# Patient Record
Sex: FEMALE | Race: WHITE | NOT HISPANIC OR LATINO | Employment: FULL TIME | URBAN - METROPOLITAN AREA
[De-identification: names, ages, dates, MRNs, and addresses within clinical notes are randomized per-mention and may not be internally consistent; named-entity substitution may affect disease eponyms.]

---

## 2023-05-02 ENCOUNTER — OFFICE VISIT (OUTPATIENT)
Dept: PRIMARY CARE | Facility: CLINIC | Age: 33
End: 2023-05-02
Payer: COMMERCIAL

## 2023-05-02 VITALS
HEIGHT: 65 IN | HEART RATE: 76 BPM | OXYGEN SATURATION: 97 % | WEIGHT: 207 LBS | DIASTOLIC BLOOD PRESSURE: 83 MMHG | BODY MASS INDEX: 34.49 KG/M2 | SYSTOLIC BLOOD PRESSURE: 137 MMHG

## 2023-05-02 DIAGNOSIS — G43.109 MIGRAINE WITH AURA AND WITHOUT STATUS MIGRAINOSUS, NOT INTRACTABLE: ICD-10-CM

## 2023-05-02 DIAGNOSIS — F41.9 ANXIETY: ICD-10-CM

## 2023-05-02 DIAGNOSIS — F90.2 ATTENTION DEFICIT HYPERACTIVITY DISORDER (ADHD), COMBINED TYPE: Primary | ICD-10-CM

## 2023-05-02 PROBLEM — E55.9 VITAMIN D DEFICIENCY: Status: ACTIVE | Noted: 2023-05-02

## 2023-05-02 PROBLEM — E53.8 VITAMIN B12 DEFICIENCY: Status: ACTIVE | Noted: 2023-05-02

## 2023-05-02 PROBLEM — R63.5 WEIGHT GAIN: Status: RESOLVED | Noted: 2023-05-02 | Resolved: 2023-05-02

## 2023-05-02 LAB
AMPHETAMINE (PRESENCE) IN URINE BY SCREEN METHOD: NORMAL
BARBITURATES PRESENCE IN URINE BY SCREEN METHOD: NORMAL
BENZODIAZEPINE (PRESENCE) IN URINE BY SCREEN METHOD: NORMAL
CANNABINOIDS IN URINE BY SCREEN METHOD: NORMAL
COCAINE (PRESENCE) IN URINE BY SCREEN METHOD: NORMAL
DRUG SCREEN COMMENT URINE: NORMAL
FENTANYL URINE: NORMAL
METHADONE (PRESENCE) IN URINE BY SCREEN METHOD: NORMAL
OPIATES (PRESENCE) IN URINE BY SCREEN METHOD: NORMAL
OXYCODONE (PRESENCE) IN URINE BY SCREEN METHOD: NORMAL
PHENCYCLIDINE (PRESENCE) IN URINE BY SCREEN METHOD: NORMAL

## 2023-05-02 PROCEDURE — 80307 DRUG TEST PRSMV CHEM ANLYZR: CPT

## 2023-05-02 PROCEDURE — 1036F TOBACCO NON-USER: CPT | Performed by: FAMILY MEDICINE

## 2023-05-02 PROCEDURE — 99213 OFFICE O/P EST LOW 20 MIN: CPT | Performed by: FAMILY MEDICINE

## 2023-05-02 RX ORDER — NORETHINDRONE ACETATE AND ETHINYL ESTRADIOL 1MG-20(21)
KIT ORAL
COMMUNITY
Start: 2022-12-29 | End: 2023-11-03 | Stop reason: SDUPTHER

## 2023-05-02 RX ORDER — SUMATRIPTAN SUCCINATE 100 MG/1
TABLET ORAL
COMMUNITY
End: 2024-02-06 | Stop reason: ALTCHOICE

## 2023-05-02 RX ORDER — BUPROPION HYDROCHLORIDE 150 MG/1
1 TABLET ORAL DAILY
COMMUNITY
Start: 2018-10-23 | End: 2023-05-02 | Stop reason: SDUPTHER

## 2023-05-02 RX ORDER — ATENOLOL 25 MG/1
75 TABLET ORAL NIGHTLY
Qty: 270 TABLET | Refills: 3 | Status: SHIPPED | OUTPATIENT
Start: 2023-05-02 | End: 2023-11-03 | Stop reason: SDUPTHER

## 2023-05-02 RX ORDER — METHYLPHENIDATE HYDROCHLORIDE 27 MG/1
1 TABLET ORAL DAILY
COMMUNITY
Start: 2022-07-13 | End: 2023-05-02 | Stop reason: SDUPTHER

## 2023-05-02 RX ORDER — METHYLPHENIDATE HYDROCHLORIDE 27 MG/1
27 TABLET ORAL DAILY
Qty: 90 TABLET | Refills: 0 | Status: SHIPPED | OUTPATIENT
Start: 2023-05-02 | End: 2023-08-01 | Stop reason: SDUPTHER

## 2023-05-02 RX ORDER — ATENOLOL 25 MG/1
TABLET ORAL
COMMUNITY
Start: 2019-01-24 | End: 2023-05-02 | Stop reason: SDUPTHER

## 2023-05-02 RX ORDER — BUPROPION HYDROCHLORIDE 150 MG/1
150 TABLET ORAL DAILY
Qty: 90 TABLET | Refills: 3 | Status: SHIPPED | OUTPATIENT
Start: 2023-05-02 | End: 2023-11-03 | Stop reason: SDUPTHER

## 2023-05-02 NOTE — PROGRESS NOTES
Subjective   Patient ID: Regine Palomino is a 32 y.o. female who presents for ADHD.  Doing well.  Acute complaints -not with her meds.      She has an occasional heart flutter at rest that happens several times a day, not painful.  It lasts just a few seconds.  This started about 2-3 months ago, no new stress, sleep is fine.      Last visit was 3-4 months ago    OARRS:  No data recorded  I have personally reviewed the OARRS report for Regine Palomino. I have considered the risks of abuse, dependence, addiction and diversion and I believe that it is clinically appropriate for Regine Palomino to be prescribed this medication    Is the patient prescribed a combination of a benzodiazepine and opioid?  No    Last Urine Drug Screen / ordered today: Yes  Recent Results (from the past 36878 hour(s))   Drug Screen, Urine With Reflex to Confirmation    Collection Time: 04/13/22  4:40 PM   Result Value Ref Range    DRUG SCREEN COMMENT URINE SEE BELOW     Amphetamine Screen, Urine PRESUMPTIVE NEGATIVE NEGATIVE    Barbiturate Screen, Urine PRESUMPTIVE NEGATIVE NEGATIVE    BENZODIAZEPINE (PRESENCE) IN URINE BY SCREEN METHOD PRESUMPTIVE NEGATIVE NEGATIVE    Cannabinoid Screen, Urine PRESUMPTIVE NEGATIVE NEGATIVE    Cocaine Screen, Urine PRESUMPTIVE NEGATIVE NEGATIVE    Fentanyl, Ur PRESUMPTIVE NEGATIVE NEGATIVE    Methadone Screen, Urine PRESUMPTIVE NEGATIVE NEGATIVE    Opiate Screen, Urine PRESUMPTIVE NEGATIVE NEGATIVE    Oxycodone Screen, Ur PRESUMPTIVE NEGATIVE NEGATIVE    PCP Screen, Urine PRESUMPTIVE NEGATIVE NEGATIVE     Results are as expected.     Controlled Substance Agreement:  Date of the Last Agreement: today  Reviewed Controlled Substance Agreement including but not limited to the benefits, risks, and alternatives to treatment with a Controlled Substance medication(s).    Stimulants:   What is the patient's goal of therapy? Better focus  Is this being achieved with current treatment? yes    Activities of  "Daily Living:   Is your overall impression that this patient is benefiting (symptom reduction outweighs side effects) from stimulant therapy? Yes     1. Physical Functioning: Better  2. Family Relationship: Same  3. Social Relationship: Same  4. Mood: Better  5. Sleep Patterns: Same  6. Overall Function: Better      PE:  /83 (BP Location: Right arm)   Pulse 76   Ht 1.651 m (5' 5\")   Wt 93.9 kg (207 lb)   SpO2 97%   BMI 34.45 kg/m²   Alert, NAD  Heart RRR no murmur  Lungs CTA bilat  Affect pleasant    Assessment/Plan   Problem List Items Addressed This Visit          Other    Anxiety    Relevant Medications    buPROPion XL (Wellbutrin XL) 150 mg 24 hr tablet    Other Relevant Orders    Drug Screen, Urine With Reflex to Confirmation (Completed)    Attention deficit hyperactivity disorder (ADHD), combined type - Primary    Relevant Medications    methylphenidate (Concerta) 27 mg daily tablet    Other Relevant Orders    Drug Screen, Urine With Reflex to Confirmation (Completed)    Migraine with aura and without status migrainosus, not intractable    Relevant Medications    atenolol (Tenormin) 25 mg tablet     Follow up in 90 days or prn       "

## 2023-08-01 ENCOUNTER — OFFICE VISIT (OUTPATIENT)
Dept: PRIMARY CARE | Facility: CLINIC | Age: 33
End: 2023-08-01
Payer: COMMERCIAL

## 2023-08-01 VITALS
SYSTOLIC BLOOD PRESSURE: 114 MMHG | HEART RATE: 77 BPM | WEIGHT: 201 LBS | DIASTOLIC BLOOD PRESSURE: 81 MMHG | BODY MASS INDEX: 33.45 KG/M2 | OXYGEN SATURATION: 97 %

## 2023-08-01 DIAGNOSIS — E16.2 HYPOGLYCEMIA: ICD-10-CM

## 2023-08-01 DIAGNOSIS — F41.9 ANXIETY: ICD-10-CM

## 2023-08-01 DIAGNOSIS — F90.2 ATTENTION DEFICIT HYPERACTIVITY DISORDER (ADHD), COMBINED TYPE: Primary | ICD-10-CM

## 2023-08-01 PROCEDURE — 1036F TOBACCO NON-USER: CPT | Performed by: FAMILY MEDICINE

## 2023-08-01 PROCEDURE — 99213 OFFICE O/P EST LOW 20 MIN: CPT | Performed by: FAMILY MEDICINE

## 2023-08-01 RX ORDER — METHYLPHENIDATE HYDROCHLORIDE 27 MG/1
27 TABLET ORAL DAILY
Qty: 90 TABLET | Refills: 0 | Status: SHIPPED | OUTPATIENT
Start: 2023-08-01 | End: 2023-11-03 | Stop reason: SDUPTHER

## 2023-08-01 RX ORDER — ZOLMITRIPTAN 2.5 MG/1
TABLET, ORALLY DISINTEGRATING ORAL
COMMUNITY
Start: 2023-01-03 | End: 2024-02-06 | Stop reason: SDUPTHER

## 2023-08-01 NOTE — PROGRESS NOTES
Subjective   Patient ID: Regine Palomino is a 32 y.o. female who presents for ADHD (3 month FU).  Doing well.  Acute complaints -none    Last visit was 3 months ago    OARRS:  No data recorded  I have personally reviewed the OARRS report for Regine Palomino. I have considered the risks of abuse, dependence, addiction and diversion and I believe that it is clinically appropriate for Regine Palomino to be prescribed this medication    Is the patient prescribed a combination of a benzodiazepine and opioid?  No    Last Urine Drug Screen / ordered today: No  Recent Results (from the past 75252 hour(s))   Drug Screen, Urine With Reflex to Confirmation    Collection Time: 05/02/23  4:49 PM   Result Value Ref Range    DRUG SCREEN COMMENT URINE SEE BELOW     Amphetamine Screen, Urine PRESUMPTIVE NEGATIVE NEGATIVE    Barbiturate Screen, Urine PRESUMPTIVE NEGATIVE NEGATIVE    BENZODIAZEPINE (PRESENCE) IN URINE BY SCREEN METHOD PRESUMPTIVE NEGATIVE NEGATIVE    Cannabinoid Screen, Urine PRESUMPTIVE NEGATIVE NEGATIVE    Cocaine Screen, Urine PRESUMPTIVE NEGATIVE NEGATIVE    Fentanyl, Ur PRESUMPTIVE NEGATIVE NEGATIVE    Methadone Screen, Urine PRESUMPTIVE NEGATIVE NEGATIVE    Opiate Screen, Urine PRESUMPTIVE NEGATIVE NEGATIVE    Oxycodone Screen, Ur PRESUMPTIVE NEGATIVE NEGATIVE    PCP Screen, Urine PRESUMPTIVE NEGATIVE NEGATIVE     Results are as expected.     Controlled Substance Agreement:  Date of the Last Agreement: May 2023  Reviewed Controlled Substance Agreement including but not limited to the benefits, risks, and alternatives to treatment with a Controlled Substance medication(s).    Stimulants:   What is the patient's goal of therapy? Better focus  Is this being achieved with current treatment? yes    Activities of Daily Living:   Is your overall impression that this patient is benefiting (symptom reduction outweighs side effects) from stimulant therapy? Yes     1. Physical Functioning: Better  2. Family  Relationship: Same  3. Social Relationship: Better  4. Mood: Better  5. Sleep Patterns: Same  6. Overall Function: Better      PE:  /81 (BP Location: Left arm)   Pulse 77   Wt 91.2 kg (201 lb)   SpO2 97%   BMI 33.45 kg/m²   Alert, NAD  Heart RRR no murmur  Lungs CTA bilat  Affect pleasant    Assessment/Plan   Problem List Items Addressed This Visit       Anxiety    Attention deficit hyperactivity disorder (ADHD), combined type - Primary    Relevant Medications    methylphenidate (Concerta) 27 mg daily tablet     Other Visit Diagnoses       Hypoglycemia            She had some questions about feeling lightheaded whenever she has not eaten or skipped a meal.  Reviewed etiology of this and how to prevent.      Follw up 3 months

## 2023-10-22 PROBLEM — K80.10 GALLBLADDER STONE WITH NONACUTE CHOLECYSTITIS: Status: ACTIVE | Noted: 2023-10-22

## 2023-10-22 PROBLEM — K82.8 GALLBLADDER SLUDGE: Status: ACTIVE | Noted: 2023-10-22

## 2023-10-22 PROBLEM — K82.9 GALLBLADDER DISORDER: Status: ACTIVE | Noted: 2023-10-22

## 2023-10-22 RX ORDER — OMEPRAZOLE 40 MG/1
40 CAPSULE, DELAYED RELEASE ORAL DAILY
COMMUNITY
Start: 2023-08-23

## 2023-10-24 ENCOUNTER — OFFICE VISIT (OUTPATIENT)
Dept: SURGERY | Facility: CLINIC | Age: 33
End: 2023-10-24
Payer: COMMERCIAL

## 2023-10-24 VITALS
RESPIRATION RATE: 17 BRPM | WEIGHT: 208.8 LBS | HEART RATE: 82 BPM | HEIGHT: 65 IN | SYSTOLIC BLOOD PRESSURE: 141 MMHG | TEMPERATURE: 98 F | DIASTOLIC BLOOD PRESSURE: 88 MMHG | OXYGEN SATURATION: 98 % | BODY MASS INDEX: 34.79 KG/M2

## 2023-10-24 DIAGNOSIS — K21.9 GERD WITHOUT ESOPHAGITIS: Primary | ICD-10-CM

## 2023-10-24 DIAGNOSIS — Z01.818 PRE-OP EXAM: ICD-10-CM

## 2023-10-24 DIAGNOSIS — K80.20 CALCULUS OF GALLBLADDER WITHOUT CHOLECYSTITIS WITHOUT OBSTRUCTION: ICD-10-CM

## 2023-10-24 PROCEDURE — 99213 OFFICE O/P EST LOW 20 MIN: CPT | Performed by: SURGERY

## 2023-10-24 PROCEDURE — 1036F TOBACCO NON-USER: CPT | Performed by: SURGERY

## 2023-10-24 RX ORDER — SODIUM CHLORIDE, SODIUM LACTATE, POTASSIUM CHLORIDE, CALCIUM CHLORIDE 600; 310; 30; 20 MG/100ML; MG/100ML; MG/100ML; MG/100ML
100 INJECTION, SOLUTION INTRAVENOUS CONTINUOUS
Status: CANCELLED | OUTPATIENT
Start: 2023-10-24

## 2023-10-24 RX ORDER — AMOXICILLIN 875 MG/1
TABLET, FILM COATED ORAL
Status: ON HOLD | COMMUNITY
Start: 2023-10-13 | End: 2023-10-30 | Stop reason: ALTCHOICE

## 2023-10-24 RX ORDER — CELECOXIB 50 MG/1
400 CAPSULE ORAL ONCE
Status: CANCELLED | OUTPATIENT
Start: 2023-10-24 | End: 2023-10-24

## 2023-10-24 RX ORDER — HEPARIN SODIUM 5000 [USP'U]/ML
5000 INJECTION, SOLUTION INTRAVENOUS; SUBCUTANEOUS ONCE
Status: CANCELLED | OUTPATIENT
Start: 2023-10-24 | End: 2023-10-24

## 2023-10-24 RX ORDER — GABAPENTIN 300 MG/1
300 CAPSULE ORAL ONCE
Status: CANCELLED | OUTPATIENT
Start: 2023-10-24 | End: 2023-10-24

## 2023-10-24 RX ORDER — SCOLOPAMINE TRANSDERMAL SYSTEM 1 MG/1
1 PATCH, EXTENDED RELEASE TRANSDERMAL
Status: CANCELLED | OUTPATIENT
Start: 2023-10-24 | End: 2023-10-27

## 2023-10-24 RX ORDER — ACETAMINOPHEN 325 MG/1
975 TABLET ORAL ONCE
Status: CANCELLED | OUTPATIENT
Start: 2023-10-24 | End: 2023-10-24

## 2023-10-24 ASSESSMENT — PAIN SCALES - GENERAL: PAINLEVEL: 0-NO PAIN

## 2023-10-24 NOTE — H&P (VIEW-ONLY)
Subjective   Patient ID: Regine Palomino is a 32 y.o. female who presents for No chief complaint on file..  HPI  32 year old female BMI 34 who has history of acute epigastric pain has been happening for a month now. She had 4 episodes of intense pain that woke her up from sleep where she has to get out of bed and walk around, that radiates to back. She has not tried any medication for this pain. Abdominal US 08/24/2023 shows a very large stone and sludge in a long narrow gallbladder without thickening. She is scheduled 09/12 for an EGD with Dr. Ortiz. She is on omeprazole without relief. Certain foods do not set it off. She has never gone to the ED for it. There is no biochemical data to support pancreatitis. She does not drink much, nonsmoker, no foreign travel. Atenolol for headaches.     Skipped EGD but still has reflux. Follows with Dr. Ortiz.    Returned to clinic 10/24/2023 for preop visit.    Review of Systems  Constitutional: no fever and no chills.   Gastrointestinal: abdominal pain, nausea, but no constipation, no heartburn, no vomiting, no blood in stools, bowel movement frequency normal, no diarrhea, no dysphagia.   Psychiatric: no anxiety.     Objective   Physical Exam  Constitutional - General appearance: In no acute distress, well appearing and well nourished.   Pulmonary - Respiratory effort: Normal respiration.   Cardiovascular - Auscultation of heart: Normal rate and rhythm, no murmurs.   Abdomen - Abdomen: Non-tender, no abdominal masses.   Assessment/Plan   Problem List Items Addressed This Visit             ICD-10-CM       Gastrointestinal and Abdominal    Calculus of gallbladder without cholecystitis without obstruction K80.20     Consented for lap yuval.         GERD without esophagitis - Primary K21.9     Intra-op EGD.          Other Visit Diagnoses         Codes    Pre-op exam     Z01.818

## 2023-10-26 ENCOUNTER — APPOINTMENT (OUTPATIENT)
Dept: SURGERY | Facility: CLINIC | Age: 33
End: 2023-10-26
Payer: COMMERCIAL

## 2023-10-30 ENCOUNTER — APPOINTMENT (OUTPATIENT)
Dept: GASTROENTEROLOGY | Facility: HOSPITAL | Age: 33
End: 2023-10-30
Payer: COMMERCIAL

## 2023-10-30 ENCOUNTER — ANESTHESIA EVENT (OUTPATIENT)
Dept: OPERATING ROOM | Facility: HOSPITAL | Age: 33
End: 2023-10-30
Payer: COMMERCIAL

## 2023-10-30 ENCOUNTER — ANESTHESIA (OUTPATIENT)
Dept: OPERATING ROOM | Facility: HOSPITAL | Age: 33
End: 2023-10-30
Payer: COMMERCIAL

## 2023-10-30 ENCOUNTER — APPOINTMENT (OUTPATIENT)
Dept: PRIMARY CARE | Facility: CLINIC | Age: 33
End: 2023-10-30
Payer: COMMERCIAL

## 2023-10-30 ENCOUNTER — HOSPITAL ENCOUNTER (OUTPATIENT)
Facility: HOSPITAL | Age: 33
Setting detail: OUTPATIENT SURGERY
Discharge: HOME | End: 2023-10-30
Attending: SURGERY | Admitting: SURGERY
Payer: COMMERCIAL

## 2023-10-30 VITALS
DIASTOLIC BLOOD PRESSURE: 69 MMHG | TEMPERATURE: 98.4 F | WEIGHT: 208.78 LBS | HEART RATE: 66 BPM | SYSTOLIC BLOOD PRESSURE: 116 MMHG | BODY MASS INDEX: 34.78 KG/M2 | OXYGEN SATURATION: 96 % | HEIGHT: 65 IN | RESPIRATION RATE: 18 BRPM

## 2023-10-30 DIAGNOSIS — K80.10 CALCULUS OF GALLBLADDER WITH CHRONIC CHOLECYSTITIS WITHOUT OBSTRUCTION: ICD-10-CM

## 2023-10-30 DIAGNOSIS — K21.9 GERD WITHOUT ESOPHAGITIS: ICD-10-CM

## 2023-10-30 DIAGNOSIS — K80.10 GALLBLADDER STONE WITH NONACUTE CHOLECYSTITIS: Primary | ICD-10-CM

## 2023-10-30 LAB
HCT VFR BLD AUTO: 40.1 % (ref 36–46)
HGB BLD-MCNC: 13.2 G/DL (ref 12–16)
PREGNANCY TEST URINE, POC: NEGATIVE

## 2023-10-30 PROCEDURE — 81025 URINE PREGNANCY TEST: CPT | Performed by: SURGERY

## 2023-10-30 PROCEDURE — 96372 THER/PROPH/DIAG INJ SC/IM: CPT | Performed by: SURGERY

## 2023-10-30 PROCEDURE — 88305 TISSUE EXAM BY PATHOLOGIST: CPT | Performed by: STUDENT IN AN ORGANIZED HEALTH CARE EDUCATION/TRAINING PROGRAM

## 2023-10-30 PROCEDURE — 47562 LAPAROSCOPIC CHOLECYSTECTOMY: CPT | Performed by: SURGERY

## 2023-10-30 PROCEDURE — 3700000002 HC GENERAL ANESTHESIA TIME - EACH INCREMENTAL 1 MINUTE: Performed by: SURGERY

## 2023-10-30 PROCEDURE — 2720000007 HC OR 272 NO HCPCS: Performed by: SURGERY

## 2023-10-30 PROCEDURE — A4217 STERILE WATER/SALINE, 500 ML: HCPCS | Performed by: SURGERY

## 2023-10-30 PROCEDURE — 88304 TISSUE EXAM BY PATHOLOGIST: CPT | Mod: TC,PARLAB | Performed by: SURGERY

## 2023-10-30 PROCEDURE — 2780000003 HC OR 278 NO HCPCS: Performed by: SURGERY

## 2023-10-30 PROCEDURE — 3600000004 HC OR TIME - INITIAL BASE CHARGE - PROCEDURE LEVEL FOUR: Performed by: SURGERY

## 2023-10-30 PROCEDURE — 88342 IMHCHEM/IMCYTCHM 1ST ANTB: CPT | Performed by: STUDENT IN AN ORGANIZED HEALTH CARE EDUCATION/TRAINING PROGRAM

## 2023-10-30 PROCEDURE — 2500000005 HC RX 250 GENERAL PHARMACY W/O HCPCS: Performed by: NURSE ANESTHETIST, CERTIFIED REGISTERED

## 2023-10-30 PROCEDURE — 7100000009 HC PHASE TWO TIME - INITIAL BASE CHARGE: Performed by: SURGERY

## 2023-10-30 PROCEDURE — 2500000004 HC RX 250 GENERAL PHARMACY W/ HCPCS (ALT 636 FOR OP/ED): Performed by: SURGERY

## 2023-10-30 PROCEDURE — 3600000009 HC OR TIME - EACH INCREMENTAL 1 MINUTE - PROCEDURE LEVEL FOUR: Performed by: SURGERY

## 2023-10-30 PROCEDURE — 85014 HEMATOCRIT: CPT | Performed by: SURGERY

## 2023-10-30 PROCEDURE — 7100000010 HC PHASE TWO TIME - EACH INCREMENTAL 1 MINUTE: Performed by: SURGERY

## 2023-10-30 PROCEDURE — 2500000001 HC RX 250 WO HCPCS SELF ADMINISTERED DRUGS (ALT 637 FOR MEDICARE OP): Performed by: SURGERY

## 2023-10-30 PROCEDURE — A47562 PR LAP,CHOLECYSTECTOMY: Performed by: NURSE ANESTHETIST, CERTIFIED REGISTERED

## 2023-10-30 PROCEDURE — 7100000001 HC RECOVERY ROOM TIME - INITIAL BASE CHARGE: Performed by: SURGERY

## 2023-10-30 PROCEDURE — 2500000004 HC RX 250 GENERAL PHARMACY W/ HCPCS (ALT 636 FOR OP/ED): Performed by: NURSE ANESTHETIST, CERTIFIED REGISTERED

## 2023-10-30 PROCEDURE — 88305 TISSUE EXAM BY PATHOLOGIST: CPT | Mod: TC,SUR,PARLAB | Performed by: SURGERY

## 2023-10-30 PROCEDURE — 3700000001 HC GENERAL ANESTHESIA TIME - INITIAL BASE CHARGE: Performed by: SURGERY

## 2023-10-30 PROCEDURE — 88304 TISSUE EXAM BY PATHOLOGIST: CPT | Performed by: STUDENT IN AN ORGANIZED HEALTH CARE EDUCATION/TRAINING PROGRAM

## 2023-10-30 PROCEDURE — 43239 EGD BIOPSY SINGLE/MULTIPLE: CPT

## 2023-10-30 PROCEDURE — 36415 COLL VENOUS BLD VENIPUNCTURE: CPT | Performed by: SURGERY

## 2023-10-30 PROCEDURE — 43239 EGD BIOPSY SINGLE/MULTIPLE: CPT | Performed by: SURGERY

## 2023-10-30 PROCEDURE — 2500000004 HC RX 250 GENERAL PHARMACY W/ HCPCS (ALT 636 FOR OP/ED): Performed by: ANESTHESIOLOGY

## 2023-10-30 PROCEDURE — A47562 PR LAP,CHOLECYSTECTOMY: Performed by: ANESTHESIOLOGY

## 2023-10-30 PROCEDURE — 7100000002 HC RECOVERY ROOM TIME - EACH INCREMENTAL 1 MINUTE: Performed by: SURGERY

## 2023-10-30 RX ORDER — ONDANSETRON HYDROCHLORIDE 2 MG/ML
INJECTION, SOLUTION INTRAVENOUS AS NEEDED
Status: DISCONTINUED | OUTPATIENT
Start: 2023-10-30 | End: 2023-10-30

## 2023-10-30 RX ORDER — HYDROMORPHONE HYDROCHLORIDE 1 MG/ML
1 INJECTION, SOLUTION INTRAMUSCULAR; INTRAVENOUS; SUBCUTANEOUS EVERY 5 MIN PRN
Status: DISCONTINUED | OUTPATIENT
Start: 2023-10-30 | End: 2023-10-30 | Stop reason: HOSPADM

## 2023-10-30 RX ORDER — ACETAMINOPHEN 325 MG/1
975 TABLET ORAL ONCE
Status: COMPLETED | OUTPATIENT
Start: 2023-10-30 | End: 2023-10-30

## 2023-10-30 RX ORDER — MIDAZOLAM HYDROCHLORIDE 1 MG/ML
1 INJECTION, SOLUTION INTRAMUSCULAR; INTRAVENOUS ONCE AS NEEDED
Status: DISCONTINUED | OUTPATIENT
Start: 2023-10-30 | End: 2023-10-30 | Stop reason: HOSPADM

## 2023-10-30 RX ORDER — SCOLOPAMINE TRANSDERMAL SYSTEM 1 MG/1
1 PATCH, EXTENDED RELEASE TRANSDERMAL ONCE
Status: DISCONTINUED | OUTPATIENT
Start: 2023-10-30 | End: 2023-10-30 | Stop reason: HOSPADM

## 2023-10-30 RX ORDER — MEPERIDINE HYDROCHLORIDE 25 MG/ML
12.5 INJECTION INTRAMUSCULAR; INTRAVENOUS; SUBCUTANEOUS EVERY 10 MIN PRN
Status: DISCONTINUED | OUTPATIENT
Start: 2023-10-30 | End: 2023-10-30 | Stop reason: HOSPADM

## 2023-10-30 RX ORDER — FENTANYL CITRATE 50 UG/ML
INJECTION, SOLUTION INTRAMUSCULAR; INTRAVENOUS AS NEEDED
Status: DISCONTINUED | OUTPATIENT
Start: 2023-10-30 | End: 2023-10-30

## 2023-10-30 RX ORDER — ROCURONIUM BROMIDE 10 MG/ML
INJECTION, SOLUTION INTRAVENOUS AS NEEDED
Status: DISCONTINUED | OUTPATIENT
Start: 2023-10-30 | End: 2023-10-30

## 2023-10-30 RX ORDER — IBUPROFEN 600 MG/1
600 TABLET ORAL EVERY 6 HOURS
Qty: 20 TABLET | Refills: 0 | Status: SHIPPED | OUTPATIENT
Start: 2023-10-30 | End: 2023-11-04

## 2023-10-30 RX ORDER — CELECOXIB 100 MG/1
400 CAPSULE ORAL ONCE
Status: COMPLETED | OUTPATIENT
Start: 2023-10-30 | End: 2023-10-30

## 2023-10-30 RX ORDER — FAMOTIDINE 10 MG/ML
20 INJECTION INTRAVENOUS ONCE
Status: DISCONTINUED | OUTPATIENT
Start: 2023-10-30 | End: 2023-10-30 | Stop reason: HOSPADM

## 2023-10-30 RX ORDER — HEPARIN SODIUM 5000 [USP'U]/ML
5000 INJECTION, SOLUTION INTRAVENOUS; SUBCUTANEOUS ONCE
Status: COMPLETED | OUTPATIENT
Start: 2023-10-30 | End: 2023-10-30

## 2023-10-30 RX ORDER — BUPIVACAINE HYDROCHLORIDE 5 MG/ML
INJECTION, SOLUTION EPIDURAL; INTRACAUDAL AS NEEDED
Status: DISCONTINUED | OUTPATIENT
Start: 2023-10-30 | End: 2023-10-30 | Stop reason: HOSPADM

## 2023-10-30 RX ORDER — CEFAZOLIN SODIUM 2 G/100ML
2 INJECTION, SOLUTION INTRAVENOUS EVERY 8 HOURS
Status: DISCONTINUED | OUTPATIENT
Start: 2023-10-30 | End: 2023-10-30 | Stop reason: HOSPADM

## 2023-10-30 RX ORDER — ACETAMINOPHEN 325 MG/1
975 TABLET ORAL ONCE
Status: DISCONTINUED | OUTPATIENT
Start: 2023-10-30 | End: 2023-10-30 | Stop reason: HOSPADM

## 2023-10-30 RX ORDER — PROMETHAZINE HYDROCHLORIDE 25 MG/ML
12.5 INJECTION, SOLUTION INTRAMUSCULAR; INTRAVENOUS ONCE AS NEEDED
Status: DISCONTINUED | OUTPATIENT
Start: 2023-10-30 | End: 2023-10-30 | Stop reason: HOSPADM

## 2023-10-30 RX ORDER — GABAPENTIN 300 MG/1
300 CAPSULE ORAL ONCE
Status: COMPLETED | OUTPATIENT
Start: 2023-10-30 | End: 2023-10-30

## 2023-10-30 RX ORDER — SODIUM CHLORIDE 0.9 G/100ML
IRRIGANT IRRIGATION AS NEEDED
Status: DISCONTINUED | OUTPATIENT
Start: 2023-10-30 | End: 2023-10-30 | Stop reason: HOSPADM

## 2023-10-30 RX ORDER — SODIUM CHLORIDE, SODIUM LACTATE, POTASSIUM CHLORIDE, CALCIUM CHLORIDE 600; 310; 30; 20 MG/100ML; MG/100ML; MG/100ML; MG/100ML
100 INJECTION, SOLUTION INTRAVENOUS CONTINUOUS
Status: DISCONTINUED | OUTPATIENT
Start: 2023-10-30 | End: 2023-10-30 | Stop reason: HOSPADM

## 2023-10-30 RX ORDER — ACETAMINOPHEN 325 MG/1
650 TABLET ORAL EVERY 6 HOURS
Qty: 30 TABLET | Refills: 0 | Status: SHIPPED | OUTPATIENT
Start: 2023-10-30 | End: 2024-02-06 | Stop reason: ALTCHOICE

## 2023-10-30 RX ORDER — SCOLOPAMINE TRANSDERMAL SYSTEM 1 MG/1
1 PATCH, EXTENDED RELEASE TRANSDERMAL
Status: DISCONTINUED | OUTPATIENT
Start: 2023-10-30 | End: 2023-10-30 | Stop reason: HOSPADM

## 2023-10-30 RX ORDER — LIDOCAINE HCL/PF 100 MG/5ML
SYRINGE (ML) INTRAVENOUS AS NEEDED
Status: DISCONTINUED | OUTPATIENT
Start: 2023-10-30 | End: 2023-10-30

## 2023-10-30 RX ORDER — DIPHENHYDRAMINE HYDROCHLORIDE 50 MG/ML
25 INJECTION INTRAMUSCULAR; INTRAVENOUS ONCE AS NEEDED
Status: DISCONTINUED | OUTPATIENT
Start: 2023-10-30 | End: 2023-10-30 | Stop reason: HOSPADM

## 2023-10-30 RX ORDER — MIDAZOLAM HYDROCHLORIDE 1 MG/ML
INJECTION, SOLUTION INTRAMUSCULAR; INTRAVENOUS AS NEEDED
Status: DISCONTINUED | OUTPATIENT
Start: 2023-10-30 | End: 2023-10-30

## 2023-10-30 RX ORDER — PROPOFOL 10 MG/ML
INJECTION, EMULSION INTRAVENOUS AS NEEDED
Status: DISCONTINUED | OUTPATIENT
Start: 2023-10-30 | End: 2023-10-30

## 2023-10-30 RX ORDER — OXYCODONE HYDROCHLORIDE 5 MG/1
5 TABLET ORAL EVERY 6 HOURS PRN
Qty: 10 TABLET | Refills: 0 | Status: SHIPPED | OUTPATIENT
Start: 2023-10-30 | End: 2024-02-06 | Stop reason: ALTCHOICE

## 2023-10-30 RX ORDER — METOCLOPRAMIDE HYDROCHLORIDE 5 MG/ML
INJECTION INTRAMUSCULAR; INTRAVENOUS AS NEEDED
Status: DISCONTINUED | OUTPATIENT
Start: 2023-10-30 | End: 2023-10-30

## 2023-10-30 RX ORDER — ONDANSETRON HYDROCHLORIDE 2 MG/ML
4 INJECTION, SOLUTION INTRAVENOUS ONCE AS NEEDED
Status: DISCONTINUED | OUTPATIENT
Start: 2023-10-30 | End: 2023-10-30 | Stop reason: HOSPADM

## 2023-10-30 RX ORDER — ALBUTEROL SULFATE 0.83 MG/ML
2.5 SOLUTION RESPIRATORY (INHALATION) ONCE AS NEEDED
Status: DISCONTINUED | OUTPATIENT
Start: 2023-10-30 | End: 2023-10-30 | Stop reason: HOSPADM

## 2023-10-30 RX ORDER — KETOROLAC TROMETHAMINE 30 MG/ML
INJECTION, SOLUTION INTRAMUSCULAR; INTRAVENOUS AS NEEDED
Status: DISCONTINUED | OUTPATIENT
Start: 2023-10-30 | End: 2023-10-30

## 2023-10-30 RX ORDER — METOPROLOL TARTRATE 1 MG/ML
5 INJECTION, SOLUTION INTRAVENOUS ONCE
Status: DISCONTINUED | OUTPATIENT
Start: 2023-10-30 | End: 2023-10-30 | Stop reason: HOSPADM

## 2023-10-30 RX ORDER — LABETALOL HYDROCHLORIDE 5 MG/ML
5 INJECTION, SOLUTION INTRAVENOUS ONCE AS NEEDED
Status: DISCONTINUED | OUTPATIENT
Start: 2023-10-30 | End: 2023-10-30 | Stop reason: HOSPADM

## 2023-10-30 RX ORDER — HYDRALAZINE HYDROCHLORIDE 20 MG/ML
5 INJECTION INTRAMUSCULAR; INTRAVENOUS EVERY 30 MIN PRN
Status: DISCONTINUED | OUTPATIENT
Start: 2023-10-30 | End: 2023-10-30 | Stop reason: HOSPADM

## 2023-10-30 RX ORDER — DEXAMETHASONE SODIUM PHOSPHATE 4 MG/ML
INJECTION, SOLUTION INTRA-ARTICULAR; INTRALESIONAL; INTRAMUSCULAR; INTRAVENOUS; SOFT TISSUE AS NEEDED
Status: DISCONTINUED | OUTPATIENT
Start: 2023-10-30 | End: 2023-10-30

## 2023-10-30 RX ORDER — MORPHINE SULFATE 2 MG/ML
2 INJECTION, SOLUTION INTRAMUSCULAR; INTRAVENOUS EVERY 5 MIN PRN
Status: DISCONTINUED | OUTPATIENT
Start: 2023-10-30 | End: 2023-10-30 | Stop reason: HOSPADM

## 2023-10-30 RX ADMIN — MIDAZOLAM 2 MG: 1 INJECTION INTRAMUSCULAR; INTRAVENOUS at 12:29

## 2023-10-30 RX ADMIN — FENTANYL CITRATE 100 MCG: 50 INJECTION, SOLUTION INTRAMUSCULAR; INTRAVENOUS at 12:44

## 2023-10-30 RX ADMIN — LIDOCAINE HYDROCHLORIDE 100 MG: 20 INJECTION INTRAVENOUS at 12:31

## 2023-10-30 RX ADMIN — CEFAZOLIN SODIUM 2 G: 2 INJECTION, SOLUTION INTRAVENOUS at 12:36

## 2023-10-30 RX ADMIN — SODIUM CHLORIDE, POTASSIUM CHLORIDE, SODIUM LACTATE AND CALCIUM CHLORIDE 100 ML/HR: 600; 310; 30; 20 INJECTION, SOLUTION INTRAVENOUS at 08:37

## 2023-10-30 RX ADMIN — ACETAMINOPHEN 975 MG: 325 TABLET ORAL at 10:17

## 2023-10-30 RX ADMIN — SCOPALAMINE 1 PATCH: 1 PATCH, EXTENDED RELEASE TRANSDERMAL at 10:19

## 2023-10-30 RX ADMIN — SUGAMMADEX 200 MG: 100 INJECTION, SOLUTION INTRAVENOUS at 13:20

## 2023-10-30 RX ADMIN — METOCLOPRAMIDE 10 MG: 5 INJECTION, SOLUTION INTRAMUSCULAR; INTRAVENOUS at 12:39

## 2023-10-30 RX ADMIN — HYDROMORPHONE HYDROCHLORIDE 1 MG: 1 INJECTION, SOLUTION INTRAMUSCULAR; INTRAVENOUS; SUBCUTANEOUS at 14:00

## 2023-10-30 RX ADMIN — FENTANYL CITRATE 50 MCG: 50 INJECTION, SOLUTION INTRAMUSCULAR; INTRAVENOUS at 13:22

## 2023-10-30 RX ADMIN — ROCURONIUM BROMIDE 50 MG: 10 INJECTION, SOLUTION INTRAVENOUS at 12:31

## 2023-10-30 RX ADMIN — KETOROLAC TROMETHAMINE 30 MG: 30 INJECTION, SOLUTION INTRAMUSCULAR at 13:14

## 2023-10-30 RX ADMIN — PROPOFOL 200 MG: 10 INJECTION, EMULSION INTRAVENOUS at 12:31

## 2023-10-30 RX ADMIN — DEXAMETHASONE SODIUM PHOSPHATE 4 MG: 4 INJECTION, SOLUTION INTRAMUSCULAR; INTRAVENOUS at 12:39

## 2023-10-30 RX ADMIN — ONDANSETRON 4 MG: 2 INJECTION INTRAMUSCULAR; INTRAVENOUS at 13:14

## 2023-10-30 RX ADMIN — FENTANYL CITRATE 50 MCG: 50 INJECTION, SOLUTION INTRAMUSCULAR; INTRAVENOUS at 12:31

## 2023-10-30 RX ADMIN — FENTANYL CITRATE 50 MCG: 50 INJECTION, SOLUTION INTRAMUSCULAR; INTRAVENOUS at 13:18

## 2023-10-30 RX ADMIN — GABAPENTIN 300 MG: 300 CAPSULE ORAL at 10:20

## 2023-10-30 RX ADMIN — HEPARIN SODIUM 5000 UNITS: 5000 INJECTION INTRAVENOUS; SUBCUTANEOUS at 10:20

## 2023-10-30 RX ADMIN — CELECOXIB 400 MG: 100 CAPSULE ORAL at 10:18

## 2023-10-30 RX ADMIN — SODIUM CHLORIDE, POTASSIUM CHLORIDE, SODIUM LACTATE AND CALCIUM CHLORIDE: 600; 310; 30; 20 INJECTION, SOLUTION INTRAVENOUS at 12:26

## 2023-10-30 SDOH — HEALTH STABILITY: MENTAL HEALTH: CURRENT SMOKER: 0

## 2023-10-30 ASSESSMENT — PAIN SCALES - GENERAL
PAINLEVEL_OUTOF10: 0 - NO PAIN
PAINLEVEL_OUTOF10: 0 - NO PAIN
PAIN_LEVEL: 4
PAINLEVEL_OUTOF10: 7
PAINLEVEL_OUTOF10: 0 - NO PAIN

## 2023-10-30 ASSESSMENT — PAIN - FUNCTIONAL ASSESSMENT
PAIN_FUNCTIONAL_ASSESSMENT: 0-10
PAIN_FUNCTIONAL_ASSESSMENT: 0-10

## 2023-10-30 NOTE — DISCHARGE INSTRUCTIONS
Incision is closed with surgical glue which will fall off on its own.   Okay to shower on 11/1. No soaking or submerging for 2 weeks.   Take tylenol for pain control. May take oxycodone only as needed for severe pain.   May drive when pain is well controlled and no longer taking narcotics.   No heavy lifting for 4 weeks.  Please call the office if you experience fevers, chills, redness around your incision, drainage, or swelling.   Please call the office to schedule your postoperative follow up visit for 2 weeks from now.

## 2023-10-30 NOTE — ANESTHESIA PROCEDURE NOTES
Airway  Date/Time: 10/30/2023 12:34 PM  Urgency: elective    Airway not difficult    Staffing  Performed: CRNA   Authorized by: Maury Car MD    Performed by: AURA Duran  Patient location during procedure: OR    Indications and Patient Condition  Indications for airway management: anesthesia and airway protection  Spontaneous Ventilation: absent  Sedation level: deep  Preoxygenated: yes  Patient position: sniffing  MILS maintained throughout  Mask difficulty assessment: 1 - vent by mask  Planned trial extubation    Final Airway Details  Final airway type: endotracheal airway      Successful airway: ETT  Cuffed: yes   Successful intubation technique: direct laryngoscopy  Facilitating devices/methods: intubating stylet  Blade: Papito  Blade size: #4  ETT size (mm): 7.0  Cormack-Lehane Classification: grade IIa - partial view of glottis  Placement verified by: capnometry   Measured from: lips  ETT to lips (cm): 21  Number of attempts at approach: 1  Number of other approaches attempted: 0

## 2023-10-30 NOTE — ANESTHESIA POSTPROCEDURE EVALUATION
Patient: Regine Palomino    Procedure Summary       Date: 10/30/23 Room / Location: [facility] 03 / [facility]    Anesthesia Start: 1227 Anesthesia Stop:     Procedures:       LAPAROSCOPIC CHOLECYSTECTOMY      EGD Diagnosis:       Calculus of gallbladder with chronic cholecystitis without obstruction      (Calculus of gallbladder with chronic cholecystitis without obstruction [K80.10])    Surgeons: Jefe Grissom MD PhD Responsible Provider: Maury Car MD    Anesthesia Type: general ASA Status: 2            Anesthesia Type: general    Vitals Value Taken Time   /75 10/30/23 1331   Temp 36.9 10/30/23 1332   Pulse 91 10/30/23 1330   Resp 16 10/30/23 1332   SpO2 99 % 10/30/23 1330   Vitals shown include unvalidated device data.    Anesthesia Post Evaluation    Patient location during evaluation: PACU  Patient participation: complete - patient participated  Level of consciousness: sleepy but conscious and awake and alert  Pain score: 4  Pain management: adequate  Airway patency: patent  Cardiovascular status: acceptable  Respiratory status: acceptable and face mask  Hydration status: acceptable        No notable events documented.

## 2023-10-30 NOTE — OP NOTE
LAPAROSCOPIC CHOLECYSTECTOMY, EGD Operative Note     Date: 10/30/2023  OR Location: [facility]    Name: Regine Palomino, : 1990, Age: 32 y.o., MRN: [id number], Sex: female    Diagnosis  Pre-op Diagnosis     * Calculus of gallbladder with chronic cholecystitis without obstruction [K80.10] Post-op Diagnosis     * Calculus of gallbladder with chronic cholecystitis without obstruction [K80.10]     Procedures    * LAPAROSCOPIC CHOLECYSTECTOMY    * EGD    Surgeons      * Jefe Grissom - Primary    Resident/Fellow/Other Assistant:  Surgeon(s) and Role:    Procedure Summary  Anesthesia: General  ASA: II  Anesthesia Staff: Anesthesiologist: Maury Car MD  CRNA: HUY Duran-CRNA  Estimated Blood Loss: 10 mL  Intra-op Medications:   Medication Name Total Dose   ceFAZolin in dextrose (iso-os) (Ancef) IVPB 2 g 2 g              Anesthesia Record               Intraprocedure I/O Totals          Intake    ceFAZolin in dextrose (iso-os) (Ancef) IVPB 2 g 100.00 mL    Total Intake 100 mL          Specimen:   ID Type Source Tests Collected by Time   1 : GALLBLADDER Tissue GALLBLADDER CHOLECYSTECTOMY SURGICAL PATHOLOGY EXAM Jefe Grissom MD PhD 10/30/2023 1259   2 : COLD BIOPSIES Tissue STOMACH ANTRUM BIOPSY SURGICAL PATHOLOGY EXAM Jefe Grissom MD PhD 10/30/2023 1315   3 : IRREGULAR Z LINE Tissue ESOPHAGUS DISTAL BIOPSY SURGICAL PATHOLOGY EXAM Jefe Grissom MD PhD 10/30/2023 1316        Staff:   Circulator: Marcelino Donald RN  Relief Circulator: Laureen Licona RN  Relief Scrub: Ana Luisa Enrique  Scrub Person: Shanice Ling RN         Drains and/or Catheters: * None in log *    Tourniquet Times:         Implants:     Findings: Elongated gallbladder with medialized cystic duct and artery against liver edge. Numerous small branches of cystic artery to cystic duct, which was of sufficient length and entered the infundibulum.    Indications: Regine Palomino is an 32 y.o. female who is  having surgery for Calculus of gallbladder with chronic cholecystitis without obstruction [K80.10].     The patient was seen in the preoperative area. The risks, benefits, complications, treatment options, non-operative alternatives, expected recovery and outcomes were discussed with the patient. The possibilities of reaction to medication, pulmonary aspiration, injury to surrounding structures, bleeding, recurrent infection, the need for additional procedures, failure to diagnose a condition, and creating a complication requiring transfusion or operation were discussed with the patient. The patient concurred with the proposed plan, giving informed consent.  The site of surgery was properly noted/marked if necessary per policy. The patient has been actively warmed in preoperative area. Preoperative antibiotics have been ordered and given within 1 hours of incision. Venous thrombosis prophylaxis have been ordered including bilateral sequential compression devices and chemical prophylaxis    Procedure Details: During the preoperative huddle the patient's identifiers, consent, operation details, and equipment was verified. The patient was taken to the operating room and then placed in the supine position with both arms abducted and pressure points were padded. Sequential compression stockings were placed and general endotracheal anesthesia was administered. The patient was then prepped and draped in the usual sterile fashion. Prophylactic antibiotics were administered. A surgical timeout was then performed confirming the correct patient, procedure, position, equipment, and any necessary operative implants.     Access to the abdomen was gained through a right midclavicular incision and utilized a 0° 5 mm laparoscope with an Optiview trocar. The abdomen was insufflated to a pressure of 15 mmHg after peritoneal access was obtained and there was no injury to bowel or surrounding structures visualized. A 12 mm port was then  placed at the umbilicus. An additional 5 mm port was placed in subxiphoid region, and an additional 5 mm trocar was placed at the right anterior axillary line. All ports were placed under direct visualization.    The gallbladder was then grasped and retracted cephalad by the dome and inferiorly and laterally at the infundibulum to open up the [place]. Gross fatty attachments were taken off the gallbladder with electrocautery, avoiding contact with underlying structures. Next, the peritoneal attachments on both the medial and lateral surfaces as well as the anterior surface of the gallbladder were taken with electrocautery. The [place] was then dissected out and freed of any fatty attachments. Carrying on this dissection further revealed 2 and only 2 structures entering the gallbladder. They were medialized against the liver but obvious and well above the sulcus. The cystic plate was then dissected up about 30% of the gallbladder confirming these structures identity. The critical view of safety was then obtained. The cystic artery was taken with two clips proximally and one distally. The cystic duct was taken close the infundibulum with two clips distally and one proximally. One lumen was visualized for each of these structures. Of note the cystic duct was taken with cautery due to instrument availability close to the infundibular clip without any conduction distally.    The gallbladder was then dissected off of the cystic plate using hook electrocautery. The liver bed was hemostatic and there was no bile spillage. The gallbladder was placed into an Endo Catch bag and removed through the umbilicus. The 12 mm port site was closed with a figure-of-8 0-vicryl suture with the abdomen desufflated. The abdomen was then re-insufflated and the suture was found to be adequate without any abdominal contents stuck the suture. In addition the liver bed was re-assessed and found to be hemostatic without any  bile. Irrigation of the cystic plate was completed, which was clear.     The abdomen was desufflated under direct visualization. All ports were removed. All counts were correct. The port sites were closed with a 4-0 Monocryl in the subcuticular plane. Dermabond was applied.    An EGD was performed which showed a Hill Grade II hiatus at 37 cm with an irregular z line, without esophagitis. Biopsies were taken at the antrum and the z-line.    The patient tolerated the procedure well. The patient was transported to the PACU in stable condition. I was present and scrubbed for all critical portions of the case.  Complications:  None; patient tolerated the procedure well.    Disposition: PACU - hemodynamically stable.  Condition: stable         Additional Details: None    Attending Attestation: I was present and scrubbed for the entire procedure.    Jefe Grissom  Phone Number: [phone]

## 2023-10-30 NOTE — BRIEF OP NOTE
Date: 10/30/2023  OR Location: [facility] OR    Name: Regine Palomino, : 1990, Age: 32 y.o., MRN: [id number], Sex: female    Diagnosis  Pre-op Diagnosis     * Calculus of gallbladder with chronic cholecystitis without obstruction [K80.10] Post-op Diagnosis     * Calculus of gallbladder with chronic cholecystitis without obstruction [K80.10]     Procedures    * LAPAROSCOPIC CHOLECYSTECTOMY    * EGD    Surgeons      * Jefe Grissom - Primary    Resident/Fellow/Other Assistant:  Surgeon(s) and Role:  Dr. Micky Weiner     Procedure Summary  Anesthesia: General  ASA: II  Anesthesia Staff: Anesthesiologist: Maury Car MD  CRNA: HUY Duran-CRNA  Estimated Blood Loss: 10mL  Intra-op Medications:   Medication Name Total Dose   ceFAZolin in dextrose (iso-os) (Ancef) IVPB 2 g 2 g              Anesthesia Record               Intraprocedure I/O Totals          Intake    ceFAZolin in dextrose (iso-os) (Ancef) IVPB 2 g 100.00 mL    Total Intake 100 mL          Specimen:   ID Type Source Tests Collected by Time   1 : GALLBLADDER Tissue GALLBLADDER CHOLECYSTECTOMY SURGICAL PATHOLOGY EXAM Jefe Grissom MD PhD 10/30/2023 1259   2 : COLD BIOPSIES Tissue STOMACH ANTRUM BIOPSY SURGICAL PATHOLOGY EXAM Jefe Grissom MD PhD 10/30/2023 1315   3 : IRREGULAR Z LINE Tissue ESOPHAGUS DISTAL BIOPSY SURGICAL PATHOLOGY EXAM Jefe Grissom MD PhD 10/30/2023 1316        Staff:   Circulator: Marcelino Donald RN  Relief Circulator: Laureen Licona RN  Relief Scrub: Ana Luisa Enrique  Scrub Person: Shanice Ling RN          Findings: Inflamed gallbladder. Hiatal hernia on EGD.     Complications:  None; patient tolerated the procedure well.     Disposition: PACU - hemodynamically stable.  Condition: stable  Specimens Collected:   ID Type Source Tests Collected by Time   1 : GALLBLADDER Tissue GALLBLADDER CHOLECYSTECTOMY SURGICAL PATHOLOGY EXAM Jefe Grissom MD PhD 10/30/2023 1259   2 : COLD BIOPSIES  Tissue STOMACH ANTRUM BIOPSY SURGICAL PATHOLOGY EXAM Jefe Grissom MD PhD 10/30/2023 9886   3 : IRREGULAR Z LINE Tissue ESOPHAGUS DISTAL BIOPSY SURGICAL PATHOLOGY EXAM Jefe Grissom MD PhD 10/30/2023 6248     Attending Attestation: I was present and scrubbed for the entire procedure.    Jefe Grissom  Phone Number: [phone]

## 2023-10-30 NOTE — ANESTHESIA PREPROCEDURE EVALUATION
Patient: Regine Palomino    Procedure Information       Date/Time: 10/30/23 1230    Procedures:       LAPAROSCOPIC CHOLECYSTECTOMY/ POSSIBLE OPEN      EGD    Location: [facility] / [facility]    Surgeons: Jefe Grissom MD PhD            Relevant Problems   Anesthesia (within normal limits)      GI   (+) GERD without esophagitis      Neuro/Psych   (+) Anxiety   (+) Attention deficit hyperactivity disorder (ADHD), combined type      GI/Hepatic   (+) Calculus of gallbladder without cholecystitis without obstruction   (+) Gallbladder sludge   (+) Gallbladder stone with nonacute cholecystitis       Clinical information reviewed:    Allergies  Meds     OB Status           NPO Detail:  NPO/Void Status  Carbonhydrate Drink Given Prior to Surgery? : N  Date of Last Liquid: 10/30/23  Time of Last Liquid: 0600  Date of Last Solid: 10/29/23  Time of Last Solid: 1900  Last Intake Type: Clear fluids         Physical Exam    Airway  Mallampati: II  TM distance: >3 FB  Neck ROM: full     Cardiovascular - normal exam  Rhythm: regular  Rate: normal     Dental    Pulmonary - normal exam     Abdominal            Anesthesia Plan    ASA 2     general     The patient is not a current smoker.    intravenous induction   Anesthetic plan and risks discussed with patient.    Plan discussed with attending and CRNA.

## 2023-11-03 ENCOUNTER — OFFICE VISIT (OUTPATIENT)
Dept: PRIMARY CARE | Facility: CLINIC | Age: 33
End: 2023-11-03
Payer: COMMERCIAL

## 2023-11-03 VITALS
BODY MASS INDEX: 33.71 KG/M2 | HEART RATE: 87 BPM | DIASTOLIC BLOOD PRESSURE: 80 MMHG | SYSTOLIC BLOOD PRESSURE: 110 MMHG | OXYGEN SATURATION: 98 % | WEIGHT: 202.6 LBS

## 2023-11-03 DIAGNOSIS — E55.9 VITAMIN D DEFICIENCY: ICD-10-CM

## 2023-11-03 DIAGNOSIS — E53.8 VITAMIN B12 DEFICIENCY: ICD-10-CM

## 2023-11-03 DIAGNOSIS — F90.2 ATTENTION DEFICIT HYPERACTIVITY DISORDER (ADHD), COMBINED TYPE: Primary | ICD-10-CM

## 2023-11-03 DIAGNOSIS — G43.109 MIGRAINE WITH AURA AND WITHOUT STATUS MIGRAINOSUS, NOT INTRACTABLE: ICD-10-CM

## 2023-11-03 DIAGNOSIS — Z30.41 ENCOUNTER FOR SURVEILLANCE OF CONTRACEPTIVE PILLS: ICD-10-CM

## 2023-11-03 DIAGNOSIS — F41.9 ANXIETY: ICD-10-CM

## 2023-11-03 PROCEDURE — 1036F TOBACCO NON-USER: CPT | Performed by: FAMILY MEDICINE

## 2023-11-03 PROCEDURE — 99214 OFFICE O/P EST MOD 30 MIN: CPT | Performed by: FAMILY MEDICINE

## 2023-11-03 RX ORDER — NORETHINDRONE ACETATE AND ETHINYL ESTRADIOL 1MG-20(21)
1 KIT ORAL DAILY
Qty: 112 TABLET | Refills: 1 | Status: SHIPPED | OUTPATIENT
Start: 2023-11-03 | End: 2023-12-07 | Stop reason: SDUPTHER

## 2023-11-03 RX ORDER — METHYLPHENIDATE HYDROCHLORIDE 27 MG/1
27 TABLET ORAL DAILY
Qty: 90 TABLET | Refills: 0 | Status: SHIPPED | OUTPATIENT
Start: 2023-11-03 | End: 2024-02-06 | Stop reason: SDUPTHER

## 2023-11-03 RX ORDER — BUPROPION HYDROCHLORIDE 150 MG/1
150 TABLET ORAL DAILY
Qty: 90 TABLET | Refills: 1 | Status: SHIPPED | OUTPATIENT
Start: 2023-11-03 | End: 2024-04-25 | Stop reason: SDUPTHER

## 2023-11-03 RX ORDER — ATENOLOL 25 MG/1
75 TABLET ORAL NIGHTLY
Qty: 270 TABLET | Refills: 3 | Status: SHIPPED | OUTPATIENT
Start: 2023-11-03

## 2023-11-06 LAB
LAB AP ASR DISCLAIMER: NORMAL
LABORATORY COMMENT REPORT: NORMAL
PATH REPORT.ADDENDUM SPEC: NORMAL
PATH REPORT.COMMENTS IMP SPEC: NORMAL
PATH REPORT.FINAL DX SPEC: NORMAL
PATH REPORT.GROSS SPEC: NORMAL
PATH REPORT.RELEVANT HX SPEC: NORMAL
PATH REPORT.TOTAL CANCER: NORMAL

## 2023-11-14 ENCOUNTER — OFFICE VISIT (OUTPATIENT)
Dept: SURGERY | Facility: CLINIC | Age: 33
End: 2023-11-14
Payer: COMMERCIAL

## 2023-11-14 VITALS
HEART RATE: 69 BPM | OXYGEN SATURATION: 98 % | TEMPERATURE: 98.4 F | BODY MASS INDEX: 34.02 KG/M2 | RESPIRATION RATE: 17 BRPM | SYSTOLIC BLOOD PRESSURE: 117 MMHG | HEIGHT: 65 IN | WEIGHT: 204.2 LBS | DIASTOLIC BLOOD PRESSURE: 77 MMHG

## 2023-11-14 DIAGNOSIS — Z09 POSTOPERATIVE EXAMINATION: Primary | ICD-10-CM

## 2023-11-14 PROCEDURE — 1036F TOBACCO NON-USER: CPT | Performed by: SURGERY

## 2023-11-14 PROCEDURE — 99024 POSTOP FOLLOW-UP VISIT: CPT | Performed by: SURGERY

## 2023-11-14 ASSESSMENT — PAIN SCALES - GENERAL: PAINLEVEL: 2

## 2023-11-14 NOTE — PROGRESS NOTES
Subjective   Patient ID: Regine Palomino is a 32 y.o. female who presents for ADHD (Patient is here for ADHD follow up and med refill. She would also like to speak with you about her gallbladder surgery she had on Monday. She would also like a referral for OB GYN. ).  Doing well.  Acute complaints -wants me to check her surgical incision scars    Last visit was 3 months ago    OARRS:  Sylvia Nino MD on 11/3/2023  2:23 PM  I have personally reviewed the OARRS report for Regine Palomino. I have considered the risks of abuse, dependence, addiction and diversion and I believe that it is clinically appropriate for Regine Palomino to be prescribed this medication    Is the patient prescribed a combination of a benzodiazepine and opioid?  No    Last Urine Drug Screen / ordered today: No  Recent Results (from the past 8760 hour(s))   Drug Screen, Urine With Reflex to Confirmation    Collection Time: 05/02/23  4:49 PM   Result Value Ref Range    DRUG SCREEN COMMENT URINE SEE BELOW     Amphetamine Screen, Urine PRESUMPTIVE NEGATIVE NEGATIVE    Barbiturate Screen, Urine PRESUMPTIVE NEGATIVE NEGATIVE    BENZODIAZEPINE (PRESENCE) IN URINE BY SCREEN METHOD PRESUMPTIVE NEGATIVE NEGATIVE    Cannabinoid Screen, Urine PRESUMPTIVE NEGATIVE NEGATIVE    Cocaine Screen, Urine PRESUMPTIVE NEGATIVE NEGATIVE    Fentanyl, Ur PRESUMPTIVE NEGATIVE NEGATIVE    Methadone Screen, Urine PRESUMPTIVE NEGATIVE NEGATIVE    Opiate Screen, Urine PRESUMPTIVE NEGATIVE NEGATIVE    Oxycodone Screen, Ur PRESUMPTIVE NEGATIVE NEGATIVE    PCP Screen, Urine PRESUMPTIVE NEGATIVE NEGATIVE     Results are as expected.     Controlled Substance Agreement:  Date of the Last Agreement: May 2023  Reviewed Controlled Substance Agreement including but not limited to the benefits, risks, and alternatives to treatment with a Controlled Substance medication(s).    Stimulants:   What is the patient's goal of therapy? Better focus  Is this being achieved with  current treatment? yes    Activities of Daily Living:   Is your overall impression that this patient is benefiting (symptom reduction outweighs side effects) from stimulant therapy? Yes     1. Physical Functioning: Better  2. Family Relationship: Same  3. Social Relationship: Better  4. Mood: Better  5. Sleep Patterns: Same  6. Overall Function: Better      PE:  /80   Pulse 87   Wt 91.9 kg (202 lb 9.6 oz)   LMP  (LMP Unknown)   SpO2 98%   BMI 33.71 kg/m²   Alert, NAD  Heart RRR no murmur  Lungs CTA bilat  Affect pleasant  Abdomen benign.  All surgical scars healing normally, no sign infection    Assessment/Plan   Problem List Items Addressed This Visit             ICD-10-CM    Anxiety F41.9    Relevant Medications    buPROPion XL (Wellbutrin XL) 150 mg 24 hr tablet    Attention deficit hyperactivity disorder (ADHD), combined type - Primary F90.2    Relevant Medications    methylphenidate (Concerta) 27 mg daily tablet    Migraine with aura and without status migrainosus, not intractable G43.109    Relevant Medications    atenolol (Tenormin) 25 mg tablet    Other Relevant Orders    Basic metabolic panel    Vitamin B12 deficiency E53.8    Relevant Orders    Vitamin B12    Vitamin D deficiency E55.9    Relevant Orders    Vitamin D 25-Hydroxy,Total (for eval of Vitamin D levels)     Other Visit Diagnoses         Codes    Encounter for surveillance of contraceptive pills     Z30.41    Relevant Medications    norethindrone-e.estradioL-iron (Junel FE 1/20) 1 mg-20 mcg (21)/75 mg (7) tablet

## 2023-11-14 NOTE — PROGRESS NOTES
"Subjective   The patient is status post Laparoscopic Cholecystectomy and Egd.  The patient is not having any pain. Patient is eating well, without constipation, and voiding without difficulty.    Objective   Physical Exam:  /77 (BP Location: Right arm, Patient Position: Sitting, BP Cuff Size: Adult long)   Pulse 69   Temp 36.9 °C (98.4 °F) (Temporal)   Resp 17   Ht 1.651 m (5' 5\")   Wt 92.6 kg (204 lb 3.2 oz)   LMP  (LMP Unknown)   SpO2 98%   BMI 33.98 kg/m²   Constitutional: alert, not in acute distress, well developed, and well nourished  Abdomen: soft, bowel sounds active, non-tender, no abnormal masses, no hernias noted  Incision(s): healing well, no drainage, no erythema, well approximated  Tenderness at incision site: none  Pathology: Chronic cholecystitis and gastritis.    Assessment/Plan   Diagnoses and all orders for this visit:  Postoperative examination  Continue PPI for gastritis and HH. Advance to normal diet.  EGD in 3-5 years.  "

## 2023-12-06 ENCOUNTER — TELEPHONE (OUTPATIENT)
Dept: PRIMARY CARE | Facility: CLINIC | Age: 33
End: 2023-12-06
Payer: COMMERCIAL

## 2023-12-06 DIAGNOSIS — Z30.41 ENCOUNTER FOR SURVEILLANCE OF CONTRACEPTIVE PILLS: ICD-10-CM

## 2023-12-06 NOTE — TELEPHONE ENCOUNTER
"Patient left voicemail on provider refill line for:    Name of medication & dose:  norethindrone-e.estradioL-iron (Junel FE 1/20) 1 mg-20 mcg (21)/75 mg (7) tablet     Quantity & refills requested: Pt asks 1 month be sent to [facility] on file and the rest to [facility] - note on the bottom    Amount of medication remainin    If out of medication, for how long?  A few days     Last office visit:  11/3/23  Last labs (if applicable):    Future appointment?  24    Pharmacy verified.  [facility] and [facility] on file  Allergies updated.       The patient was informed the requested medication request will be processed within 3 business days unless they are contacted by a staff member to advise otherwise.        Pt said on 11/3/23 the [facility] was [facility], but [facility] tried to call for clarification and no response so the RX was denied. I read the instructions \"Take 1 tablet by mouth once daily. - oral \" and asked if they stated what they needed clarification on. Pt said no but the instructions also usually state \"Skip Placebo week\" and didn't know if that would make a difference.   "

## 2023-12-07 RX ORDER — NORETHINDRONE ACETATE AND ETHINYL ESTRADIOL 1MG-20(21)
1 KIT ORAL DAILY
Qty: 112 TABLET | Refills: 0 | Status: SHIPPED | OUTPATIENT
Start: 2023-12-07 | End: 2024-02-06 | Stop reason: SDUPTHER

## 2023-12-07 NOTE — TELEPHONE ENCOUNTER
Pt called in to inquire about status of refill request stating that she took her last pill today.

## 2024-02-06 ENCOUNTER — OFFICE VISIT (OUTPATIENT)
Dept: PRIMARY CARE | Facility: CLINIC | Age: 34
End: 2024-02-06
Payer: COMMERCIAL

## 2024-02-06 VITALS
BODY MASS INDEX: 34.99 KG/M2 | SYSTOLIC BLOOD PRESSURE: 116 MMHG | HEART RATE: 80 BPM | WEIGHT: 210 LBS | HEIGHT: 65 IN | DIASTOLIC BLOOD PRESSURE: 82 MMHG | OXYGEN SATURATION: 97 %

## 2024-02-06 DIAGNOSIS — G43.109 MIGRAINE WITH AURA AND WITHOUT STATUS MIGRAINOSUS, NOT INTRACTABLE: Primary | ICD-10-CM

## 2024-02-06 DIAGNOSIS — F90.2 ATTENTION DEFICIT HYPERACTIVITY DISORDER (ADHD), COMBINED TYPE: ICD-10-CM

## 2024-02-06 DIAGNOSIS — Z30.41 ENCOUNTER FOR SURVEILLANCE OF CONTRACEPTIVE PILLS: ICD-10-CM

## 2024-02-06 PROCEDURE — 99213 OFFICE O/P EST LOW 20 MIN: CPT | Performed by: FAMILY MEDICINE

## 2024-02-06 PROCEDURE — 1036F TOBACCO NON-USER: CPT | Performed by: FAMILY MEDICINE

## 2024-02-06 PROCEDURE — 99395 PREV VISIT EST AGE 18-39: CPT | Performed by: FAMILY MEDICINE

## 2024-02-06 RX ORDER — NORETHINDRONE ACETATE AND ETHINYL ESTRADIOL 1MG-20(21)
KIT ORAL
Qty: 112 TABLET | Refills: 3 | Status: SHIPPED | OUTPATIENT
Start: 2024-02-06

## 2024-02-06 RX ORDER — METHYLPHENIDATE HYDROCHLORIDE 27 MG/1
27 TABLET ORAL DAILY
Qty: 90 TABLET | Refills: 0 | Status: SHIPPED | OUTPATIENT
Start: 2024-02-06 | End: 2024-04-25 | Stop reason: SDUPTHER

## 2024-02-06 RX ORDER — ZOLMITRIPTAN 2.5 MG/1
TABLET, ORALLY DISINTEGRATING ORAL
Qty: 6 TABLET | Refills: 1 | Status: SHIPPED | OUTPATIENT
Start: 2024-02-06

## 2024-02-06 ASSESSMENT — PATIENT HEALTH QUESTIONNAIRE - PHQ9
1. LITTLE INTEREST OR PLEASURE IN DOING THINGS: NOT AT ALL
2. FEELING DOWN, DEPRESSED OR HOPELESS: NOT AT ALL
SUM OF ALL RESPONSES TO PHQ9 QUESTIONS 1 AND 2: 0

## 2024-02-06 NOTE — PROGRESS NOTES
Subjective   Patient ID: Regine Palomino is a 33 y.o. female who presents for Annual Exam (Patient is here for annual physical. ).  And will be switching GYN care also.   She is also following up for her ADHD.     Past Medical, Surgical, Social and Family Hx reviewed-Yes    Any acute complaints?    If she does not drink enough water she feels like she has a UTI.    She takes metamucil daily to help avoid constipation and/or diarrhea which has been chronic for you.  This was even before her gallbladder issues.      Any chronic issues addressed today or Rx refills needed?    Yes, see below    Last pap 10/2022  Last Mammogram N/A  Colon CA screening Hx N/A  Labs reviewed  Up to date on immunizations yes  Dentist in the past year yes    Menstruation no concerns  Sexual Activity no concerns    Subjective   Patient ID: Regine Palomino is a 33 y.o. female who presents for Annual Exam (Patient is here for annual physical. ).  Doing well.  Acute complaints -meds working well    Last visit was 3 months ago    OARRS:  No data recorded  I have personally reviewed the OARRS report for Regine Palomino. I have considered the risks of abuse, dependence, addiction and diversion and I believe that it is clinically appropriate for Regine Palomino to be prescribed this medication    Is the patient prescribed a combination of a benzodiazepine and opioid?  No    Last Urine Drug Screen / ordered today: No  Recent Results (from the past 8760 hour(s))   Drug Screen, Urine With Reflex to Confirmation    Collection Time: 05/02/23  4:49 PM   Result Value Ref Range    DRUG SCREEN COMMENT URINE SEE BELOW     Amphetamine Screen, Urine PRESUMPTIVE NEGATIVE NEGATIVE    Barbiturate Screen, Urine PRESUMPTIVE NEGATIVE NEGATIVE    BENZODIAZEPINE (PRESENCE) IN URINE BY SCREEN METHOD PRESUMPTIVE NEGATIVE NEGATIVE    Cannabinoid Screen, Urine PRESUMPTIVE NEGATIVE NEGATIVE    Cocaine Screen, Urine PRESUMPTIVE NEGATIVE NEGATIVE    Fentanyl, Ur  "PRESUMPTIVE NEGATIVE NEGATIVE    Methadone Screen, Urine PRESUMPTIVE NEGATIVE NEGATIVE    Opiate Screen, Urine PRESUMPTIVE NEGATIVE NEGATIVE    Oxycodone Screen, Ur PRESUMPTIVE NEGATIVE NEGATIVE    PCP Screen, Urine PRESUMPTIVE NEGATIVE NEGATIVE     Results are as expected.     Controlled Substance Agreement:  Date of the Last Agreement: May 2023  Reviewed Controlled Substance Agreement including but not limited to the benefits, risks, and alternatives to treatment with a Controlled Substance medication(s).    Stimulants:   What is the patient's goal of therapy? Better focus  Is this being achieved with current treatment? yes    Activities of Daily Living:   Is your overall impression that this patient is benefiting (symptom reduction outweighs side effects) from stimulant therapy? Yes       PE:  /82   Pulse 80   Ht 1.641 m (5' 4.6\")   Wt 95.3 kg (210 lb)   LMP  (LMP Unknown)   SpO2 97%   BMI 35.38 kg/m²   Alert adult woman, NAD  HEENT clear  Neck supple, No LAD  Heart RRR no murmur  Lungs CTA bilat  Abdomen benign, normal BS, soft NT/ND  Skin warm, dry, clear  Neuro grossly normal, gait WNL  Affect pleasant and appropriate, memory and judgement WNL      Assessment/Plan   Problem List Items Addressed This Visit             ICD-10-CM    Attention deficit hyperactivity disorder (ADHD), combined type F90.2    Relevant Medications    methylphenidate ER (Concerta) 27 mg daily tablet    Migraine with aura and without status migrainosus, not intractable - Primary G43.109    Relevant Medications    ZOLMitriptan (Zomig-ZMT) 2.5 mg disintegrating tablet     Other Visit Diagnoses         Codes    Encounter for surveillance of contraceptive pills     Z30.41    Relevant Medications    norethindrone-e.estradioL-iron (Junel FE 1/20) 1 mg-20 mcg (21)/75 mg (7) tablet               "

## 2024-04-25 ENCOUNTER — OFFICE VISIT (OUTPATIENT)
Dept: PRIMARY CARE | Facility: CLINIC | Age: 34
End: 2024-04-25
Payer: COMMERCIAL

## 2024-04-25 VITALS
DIASTOLIC BLOOD PRESSURE: 80 MMHG | SYSTOLIC BLOOD PRESSURE: 120 MMHG | BODY MASS INDEX: 35.38 KG/M2 | HEART RATE: 68 BPM | WEIGHT: 210 LBS | OXYGEN SATURATION: 96 %

## 2024-04-25 DIAGNOSIS — F90.2 ATTENTION DEFICIT HYPERACTIVITY DISORDER (ADHD), COMBINED TYPE: Primary | ICD-10-CM

## 2024-04-25 DIAGNOSIS — F41.9 ANXIETY: ICD-10-CM

## 2024-04-25 LAB
AMPHETAMINES UR QL SCN: NORMAL
BARBITURATES UR QL SCN: NORMAL
BENZODIAZ UR QL SCN: NORMAL
BZE UR QL SCN: NORMAL
CANNABINOIDS UR QL SCN: NORMAL
FENTANYL+NORFENTANYL UR QL SCN: NORMAL
METHADONE UR QL SCN: NORMAL
OPIATES UR QL SCN: NORMAL
OXYCODONE+OXYMORPHONE UR QL SCN: NORMAL
PCP UR QL SCN: NORMAL

## 2024-04-25 PROCEDURE — 99213 OFFICE O/P EST LOW 20 MIN: CPT | Performed by: FAMILY MEDICINE

## 2024-04-25 PROCEDURE — 1036F TOBACCO NON-USER: CPT | Performed by: FAMILY MEDICINE

## 2024-04-25 PROCEDURE — 80307 DRUG TEST PRSMV CHEM ANLYZR: CPT

## 2024-04-25 RX ORDER — BUPROPION HYDROCHLORIDE 150 MG/1
150 TABLET ORAL DAILY
Qty: 90 TABLET | Refills: 1 | Status: SHIPPED | OUTPATIENT
Start: 2024-04-25

## 2024-04-25 RX ORDER — METHYLPHENIDATE HYDROCHLORIDE 27 MG/1
27 TABLET ORAL DAILY
Qty: 90 TABLET | Refills: 0 | Status: SHIPPED | OUTPATIENT
Start: 2024-04-25

## 2024-04-25 ASSESSMENT — ENCOUNTER SYMPTOMS
DEPRESSION: 0
LOSS OF SENSATION IN FEET: 0
OCCASIONAL FEELINGS OF UNSTEADINESS: 0

## 2024-04-25 NOTE — PROGRESS NOTES
Subjective   Patient ID: Regine Palomino is a 33 y.o. female who presents for ADHD (Patient is here for ADHD follow up. ).  Doing well.  Acute complaints -none    Last visit was 3 months ago    OARRS:  Yes  I have personally reviewed the OARRS report for Regine Palomino. I have considered the risks of abuse, dependence, addiction and diversion and I believe that it is clinically appropriate for Regine Palomino to be prescribed this medication    Is the patient prescribed a combination of a benzodiazepine and opioid?  No  Last Urine Drug Screen / ordered today: Yes  Recent Results (from the past 8760 hour(s))   Drug Screen, Urine With Reflex to Confirmation    Collection Time: 05/02/23  4:49 PM   Result Value Ref Range    DRUG SCREEN COMMENT URINE SEE BELOW     Amphetamine Screen, Urine PRESUMPTIVE NEGATIVE NEGATIVE    Barbiturate Screen, Urine PRESUMPTIVE NEGATIVE NEGATIVE    BENZODIAZEPINE (PRESENCE) IN URINE BY SCREEN METHOD PRESUMPTIVE NEGATIVE NEGATIVE    Cannabinoid Screen, Urine PRESUMPTIVE NEGATIVE NEGATIVE    Cocaine Screen, Urine PRESUMPTIVE NEGATIVE NEGATIVE    Fentanyl, Ur PRESUMPTIVE NEGATIVE NEGATIVE    Methadone Screen, Urine PRESUMPTIVE NEGATIVE NEGATIVE    Opiate Screen, Urine PRESUMPTIVE NEGATIVE NEGATIVE    Oxycodone Screen, Ur PRESUMPTIVE NEGATIVE NEGATIVE    PCP Screen, Urine PRESUMPTIVE NEGATIVE NEGATIVE     Results are as expected.     Controlled Substance Agreement:  Date of the Last Agreement: today  Reviewed Controlled Substance Agreement including but not limited to the benefits, risks, and alternatives to treatment with a Controlled Substance medication(s).    Stimulants:   What is the patient's goal of therapy? Better focus  Is this being achieved with current treatment? yes    Activities of Daily Living:   Is your overall impression that this patient is benefiting (symptom reduction outweighs side effects) from stimulant therapy? Yes       PE:  /80   Pulse 68   Wt 95.3  kg (210 lb)   LMP 03/25/2024   SpO2 96%   BMI 35.38 kg/m²   Alert, NAD  Heart RRR no murmur  Lungs CTA bilat  Affect pleasant    Assessment/Plan   Problem List Items Addressed This Visit             ICD-10-CM    Anxiety F41.9    Relevant Medications    buPROPion XL (Wellbutrin XL) 150 mg 24 hr tablet    Other Relevant Orders    Drug Screen, Urine With Reflex to Confirmation (Completed)    Attention deficit hyperactivity disorder (ADHD), combined type - Primary F90.2    Relevant Medications    methylphenidate ER 27 mg oral extended release tablet    Other Relevant Orders    Drug Screen, Urine With Reflex to Confirmation (Completed)     Follow up 3  months

## 2024-05-02 ENCOUNTER — APPOINTMENT (OUTPATIENT)
Dept: PRIMARY CARE | Facility: CLINIC | Age: 34
End: 2024-05-02
Payer: COMMERCIAL

## 2024-06-18 ENCOUNTER — PATIENT MESSAGE (OUTPATIENT)
Dept: PRIMARY CARE | Facility: CLINIC | Age: 34
End: 2024-06-18
Payer: COMMERCIAL

## 2024-06-18 DIAGNOSIS — Z11.3 ROUTINE SCREENING FOR STI (SEXUALLY TRANSMITTED INFECTION): Primary | ICD-10-CM

## 2024-06-19 ENCOUNTER — LAB (OUTPATIENT)
Dept: LAB | Facility: LAB | Age: 34
End: 2024-06-19
Payer: COMMERCIAL

## 2024-06-19 DIAGNOSIS — E55.9 VITAMIN D DEFICIENCY: ICD-10-CM

## 2024-06-19 DIAGNOSIS — Z11.3 ROUTINE SCREENING FOR STI (SEXUALLY TRANSMITTED INFECTION): ICD-10-CM

## 2024-06-19 DIAGNOSIS — E53.8 VITAMIN B12 DEFICIENCY: ICD-10-CM

## 2024-06-19 DIAGNOSIS — G43.109 MIGRAINE WITH AURA AND WITHOUT STATUS MIGRAINOSUS, NOT INTRACTABLE: ICD-10-CM

## 2024-06-19 LAB
25(OH)D3 SERPL-MCNC: 40 NG/ML (ref 30–100)
ANION GAP SERPL CALC-SCNC: 11 MMOL/L (ref 10–20)
BUN SERPL-MCNC: 10 MG/DL (ref 6–23)
CALCIUM SERPL-MCNC: 9.5 MG/DL (ref 8.6–10.6)
CHLORIDE SERPL-SCNC: 102 MMOL/L (ref 98–107)
CO2 SERPL-SCNC: 30 MMOL/L (ref 21–32)
CREAT SERPL-MCNC: 0.74 MG/DL (ref 0.5–1.05)
EGFRCR SERPLBLD CKD-EPI 2021: >90 ML/MIN/1.73M*2
GLUCOSE SERPL-MCNC: 90 MG/DL (ref 74–99)
HCV AB SER QL: NONREACTIVE
HIV 1+2 AB+HIV1 P24 AG SERPL QL IA: NONREACTIVE
POTASSIUM SERPL-SCNC: 4.5 MMOL/L (ref 3.5–5.3)
SODIUM SERPL-SCNC: 138 MMOL/L (ref 136–145)
TREPONEMA PALLIDUM IGG+IGM AB [PRESENCE] IN SERUM OR PLASMA BY IMMUNOASSAY: NONREACTIVE
VIT B12 SERPL-MCNC: 253 PG/ML (ref 211–911)

## 2024-06-19 PROCEDURE — 87591 N.GONORRHOEAE DNA AMP PROB: CPT

## 2024-06-19 PROCEDURE — 36415 COLL VENOUS BLD VENIPUNCTURE: CPT

## 2024-06-19 PROCEDURE — 82306 VITAMIN D 25 HYDROXY: CPT

## 2024-06-19 PROCEDURE — 80048 BASIC METABOLIC PNL TOTAL CA: CPT

## 2024-06-19 PROCEDURE — 87389 HIV-1 AG W/HIV-1&-2 AB AG IA: CPT

## 2024-06-19 PROCEDURE — 87491 CHLMYD TRACH DNA AMP PROBE: CPT

## 2024-06-19 PROCEDURE — 86780 TREPONEMA PALLIDUM: CPT

## 2024-06-19 PROCEDURE — 82607 VITAMIN B-12: CPT

## 2024-06-19 PROCEDURE — 86803 HEPATITIS C AB TEST: CPT

## 2024-06-20 LAB
C TRACH RRNA SPEC QL NAA+PROBE: NEGATIVE
N GONORRHOEA DNA SPEC QL PROBE+SIG AMP: NEGATIVE

## 2024-07-24 ENCOUNTER — APPOINTMENT (OUTPATIENT)
Dept: PRIMARY CARE | Facility: CLINIC | Age: 34
End: 2024-07-24
Payer: COMMERCIAL

## 2024-07-24 VITALS
DIASTOLIC BLOOD PRESSURE: 81 MMHG | WEIGHT: 200 LBS | BODY MASS INDEX: 33.7 KG/M2 | SYSTOLIC BLOOD PRESSURE: 115 MMHG | OXYGEN SATURATION: 98 % | HEART RATE: 79 BPM

## 2024-07-24 DIAGNOSIS — F90.2 ATTENTION DEFICIT HYPERACTIVITY DISORDER (ADHD), COMBINED TYPE: ICD-10-CM

## 2024-07-24 DIAGNOSIS — F41.9 ANXIETY: ICD-10-CM

## 2024-07-24 PROCEDURE — 99213 OFFICE O/P EST LOW 20 MIN: CPT | Performed by: FAMILY MEDICINE

## 2024-07-24 PROCEDURE — 1036F TOBACCO NON-USER: CPT | Performed by: FAMILY MEDICINE

## 2024-07-24 RX ORDER — BUPROPION HYDROCHLORIDE 300 MG/1
300 TABLET ORAL DAILY
Qty: 90 TABLET | Refills: 1 | Status: SHIPPED | OUTPATIENT
Start: 2024-07-24

## 2024-07-24 RX ORDER — METHYLPHENIDATE HYDROCHLORIDE 27 MG/1
27 TABLET ORAL DAILY
Qty: 90 TABLET | Refills: 0 | Status: SHIPPED | OUTPATIENT
Start: 2024-07-24

## 2024-07-24 NOTE — PROGRESS NOTES
Subjective   Patient ID: Regine Palomino is a 33 y.o. female who presents for Anxiety (Pt states she is here for anxiety follow up. Pt states she wants to discuss weight loss options. No other concerns at the moment.).  Doing well.  Acute complaints -none    She was seen at a [facility] and started a semaglutide.  It is compounded and is $10 per vial for 3 months.  She has lost 10 lbs with minimal side effects.      Last visit was 3 months ago    OARRS:  Sylvia Nino MD on 7/24/2024 10:11 AM  I have personally reviewed the OARRS report for Regine Palomino. I have considered the risks of abuse, dependence, addiction and diversion and I believe that it is clinically appropriate for Regine Palomino to be prescribed this medication    Is the patient prescribed a combination of a benzodiazepine and opioid?  No    Last Urine Drug Screen / ordered today: No  Recent Results (from the past 8760 hour(s))   Drug Screen, Urine With Reflex to Confirmation    Collection Time: 04/25/24  2:14 PM   Result Value Ref Range    Amphetamine Screen, Urine Presumptive Negative Presumptive Negative    Barbiturate Screen, Urine Presumptive Negative Presumptive Negative    Benzodiazepines Screen, Urine Presumptive Negative Presumptive Negative    Cannabinoid Screen, Urine Presumptive Negative Presumptive Negative    Cocaine Metabolite Screen, Urine Presumptive Negative Presumptive Negative    Fentanyl Screen, Urine Presumptive Negative Presumptive Negative    Opiate Screen, Urine Presumptive Negative Presumptive Negative    Oxycodone Screen, Urine Presumptive Negative Presumptive Negative    PCP Screen, Urine Presumptive Negative Presumptive Negative    Methadone Screen, Urine Presumptive Negative Presumptive Negative     Results are as expected.     Controlled Substance Agreement:  Date of the Last Agreement: 4/2024  Reviewed Controlled Substance Agreement including but not limited to the benefits, risks, and alternatives to  treatment with a Controlled Substance medication(s).    Benzodiazepines:  What is the patient's goal of therapy? Better focus  Is this being achieved with current treatment? yes    CARIE-7:  No data recorded      PE:  /81   Pulse 79   Wt 90.7 kg (200 lb)   SpO2 98%   BMI 33.70 kg/m²   Alert, NAD  Heart RRR no murmur  Lungs CTA bilat  Affect pleasant    Assessment/Plan   Problem List Items Addressed This Visit             ICD-10-CM    Anxiety F41.9    Relevant Medications    buPROPion XL (Wellbutrin XL) 300 mg 24 hr tablet    Attention deficit hyperactivity disorder (ADHD), combined type F90.2    Relevant Medications    methylphenidate ER 27 mg oral extended release tablet     Follow up 90 days

## 2024-08-13 ENCOUNTER — PATIENT MESSAGE (OUTPATIENT)
Dept: PRIMARY CARE | Facility: CLINIC | Age: 34
End: 2024-08-13
Payer: COMMERCIAL

## 2024-08-13 DIAGNOSIS — E66.9 OBESITY, CLASS I, BMI 30-34.9: Primary | ICD-10-CM

## 2024-10-24 ENCOUNTER — APPOINTMENT (OUTPATIENT)
Dept: PRIMARY CARE | Facility: CLINIC | Age: 34
End: 2024-10-24
Payer: COMMERCIAL

## 2024-10-24 VITALS
OXYGEN SATURATION: 98 % | HEART RATE: 86 BPM | SYSTOLIC BLOOD PRESSURE: 103 MMHG | WEIGHT: 187 LBS | BODY MASS INDEX: 31.5 KG/M2 | DIASTOLIC BLOOD PRESSURE: 74 MMHG

## 2024-10-24 DIAGNOSIS — E66.811 OBESITY, CLASS I, BMI 30-34.9: ICD-10-CM

## 2024-10-24 DIAGNOSIS — G43.109 MIGRAINE WITH AURA AND WITHOUT STATUS MIGRAINOSUS, NOT INTRACTABLE: ICD-10-CM

## 2024-10-24 DIAGNOSIS — F90.2 ATTENTION DEFICIT HYPERACTIVITY DISORDER (ADHD), COMBINED TYPE: Primary | ICD-10-CM

## 2024-10-24 PROCEDURE — 99214 OFFICE O/P EST MOD 30 MIN: CPT | Performed by: FAMILY MEDICINE

## 2024-10-24 PROCEDURE — 1036F TOBACCO NON-USER: CPT | Performed by: FAMILY MEDICINE

## 2024-10-24 RX ORDER — ATENOLOL 25 MG/1
75 TABLET ORAL NIGHTLY
Qty: 270 TABLET | Refills: 3 | Status: SHIPPED | OUTPATIENT
Start: 2024-10-24 | End: 2024-10-30 | Stop reason: SDUPTHER

## 2024-10-24 RX ORDER — METHYLPHENIDATE HYDROCHLORIDE 27 MG/1
27 TABLET ORAL DAILY
Qty: 90 TABLET | Refills: 0 | Status: SHIPPED | OUTPATIENT
Start: 2024-10-24 | End: 2024-10-30 | Stop reason: SDUPTHER

## 2024-10-30 DIAGNOSIS — F41.9 ANXIETY: ICD-10-CM

## 2024-10-30 DIAGNOSIS — F90.2 ATTENTION DEFICIT HYPERACTIVITY DISORDER (ADHD), COMBINED TYPE: ICD-10-CM

## 2024-10-30 DIAGNOSIS — G43.109 MIGRAINE WITH AURA AND WITHOUT STATUS MIGRAINOSUS, NOT INTRACTABLE: ICD-10-CM

## 2024-10-30 RX ORDER — METHYLPHENIDATE HYDROCHLORIDE 27 MG/1
27 TABLET ORAL DAILY
Qty: 90 TABLET | Refills: 0 | Status: SHIPPED | OUTPATIENT
Start: 2024-10-30

## 2024-10-30 RX ORDER — BUPROPION HYDROCHLORIDE 300 MG/1
300 TABLET ORAL DAILY
Qty: 90 TABLET | Refills: 1 | Status: SHIPPED | OUTPATIENT
Start: 2024-10-30

## 2024-10-30 RX ORDER — METHYLPHENIDATE HYDROCHLORIDE 27 MG/1
27 TABLET ORAL DAILY
Qty: 90 TABLET | Refills: 0 | Status: CANCELLED | OUTPATIENT
Start: 2024-10-30

## 2024-10-30 RX ORDER — ATENOLOL 25 MG/1
75 TABLET ORAL NIGHTLY
Qty: 270 TABLET | Refills: 3 | Status: SHIPPED | OUTPATIENT
Start: 2024-10-30

## 2025-01-21 ENCOUNTER — APPOINTMENT (OUTPATIENT)
Dept: PRIMARY CARE | Facility: CLINIC | Age: 35
End: 2025-01-21
Payer: COMMERCIAL

## 2025-01-31 ENCOUNTER — OFFICE VISIT (OUTPATIENT)
Dept: URGENT CARE | Age: 35
End: 2025-01-31
Payer: COMMERCIAL

## 2025-01-31 ENCOUNTER — ANCILLARY PROCEDURE (OUTPATIENT)
Dept: URGENT CARE | Age: 35
End: 2025-01-31
Payer: COMMERCIAL

## 2025-01-31 VITALS
OXYGEN SATURATION: 98 % | BODY MASS INDEX: 30.82 KG/M2 | DIASTOLIC BLOOD PRESSURE: 86 MMHG | WEIGHT: 185 LBS | SYSTOLIC BLOOD PRESSURE: 124 MMHG | TEMPERATURE: 97.9 F | RESPIRATION RATE: 20 BRPM | HEART RATE: 91 BPM | HEIGHT: 65 IN

## 2025-01-31 DIAGNOSIS — R50.9 FEVER, UNSPECIFIED FEVER CAUSE: ICD-10-CM

## 2025-01-31 DIAGNOSIS — J98.8 RESPIRATORY TRACT INFECTION: Primary | ICD-10-CM

## 2025-01-31 DIAGNOSIS — R09.89 CHEST CONGESTION: ICD-10-CM

## 2025-01-31 DIAGNOSIS — R68.83 CHILLS: ICD-10-CM

## 2025-01-31 LAB
POC RAPID INFLUENZA A: NEGATIVE
POC RAPID INFLUENZA B: NEGATIVE

## 2025-01-31 PROCEDURE — 71046 X-RAY EXAM CHEST 2 VIEWS: CPT

## 2025-01-31 RX ORDER — BENZONATATE 200 MG/1
200 CAPSULE ORAL EVERY 8 HOURS PRN
Qty: 30 CAPSULE | Refills: 0 | Status: SHIPPED | OUTPATIENT
Start: 2025-01-31

## 2025-01-31 RX ORDER — AZITHROMYCIN 250 MG/1
TABLET, FILM COATED ORAL
Qty: 6 TABLET | Refills: 0 | Status: SHIPPED | OUTPATIENT
Start: 2025-01-31

## 2025-01-31 RX ORDER — ALBUTEROL SULFATE 90 UG/1
2 INHALANT RESPIRATORY (INHALATION) EVERY 6 HOURS PRN
Qty: 18 G | Refills: 0 | Status: SHIPPED | OUTPATIENT
Start: 2025-01-31

## 2025-01-31 ASSESSMENT — ENCOUNTER SYMPTOMS
CHILLS: 1
SHORTNESS OF BREATH: 1
VOMITING: 0
COUGH: 1
FEVER: 1
STRIDOR: 0
TROUBLE SWALLOWING: 0
PALPITATIONS: 0

## 2025-01-31 NOTE — PROGRESS NOTES
Subjective   Patient ID: Regine Palomino is a 34 y.o. female. They present today with a chief complaint of Sinus Problem (Chest congestion, lot of mucase. Fever, chills, hard to take a deep breath. Took a covid test Tuesday it was negative. ).    HISTORY OF PRESENT ILLNESS:    Patient presents to the clinic for inflammatory cough (occasionally productive of yellow mucus), chest congestion, fever/chills, and some difficulty with taking a deep breath in. She initially started feeling unwell 4 days ago. 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 Denies hx of asthma or inhaler use. Negative for COVID-19 at home.    Past Medical History  Allergies as of 01/31/2025    (No Known Allergies)       Current Outpatient Medications   Medication Instructions    albuterol 90 mcg/actuation inhaler 2 puffs, inhalation, Every 6 hours PRN    atenolol (TENORMIN) 75 mg, oral, Nightly    azithromycin (Zithromax) 250 mg tablet Take 2 tabs (500 mg) by mouth today, then take 1 tab daily for 4 days.    benzonatate (TESSALON) 200 mg, oral, Every 8 hours PRN, Do not crush or chew.    buPROPion XL (WELLBUTRIN XL) 300 mg, oral, Daily    methylphenidate ER 27 mg, oral, Daily    norethindrone-e.estradioL-iron (Junel FE 1/20) 1 mg-20 mcg (21)/75 mg (7) tablet Take 1 po continuously without menses, no placebo pills    omeprazole (PRILOSEC) 40 mg, Daily    semaglutide 0.25 mg, subcutaneous, Once Weekly    semaglutide 0.5 mg, subcutaneous, Weekly    ZOLMitriptan (Zomig-ZMT) 2.5 mg disintegrating tablet 1 po daily at onset of headache.  May repeat once after 2 hours if needed, max 2 tabs in 24 hours.         Past Medical History:   Diagnosis Date    Anxiety     Anxiety disorder, unspecified 07/13/2022    Anxiety    Depression     GERD (gastroesophageal reflux disease)        Past Surgical History:   Procedure Laterality Date    CHOLECYSTECTOMY  2023    OTHER SURGICAL HISTORY  03/01/2017    Oral Surgery Tooth Extraction Secor Tooth         "reports that she quit smoking about 8 years ago. Her smoking use included cigarettes. She started smoking about 11 years ago. She has a 1.5 pack-year smoking history. She has never used smokeless tobacco. She reports current alcohol use of about 8.0 standard drinks of alcohol per week. She reports that she does not currently use drugs.    Review of Systems    Review of Systems   Constitutional:  Positive for chills and fever.   HENT:  Negative for drooling and trouble swallowing.    Respiratory:  Positive for cough and shortness of breath. Negative for stridor.         (+) chest congestion   Cardiovascular:  Negative for palpitations.   Gastrointestinal:  Negative for vomiting.   Skin:  Negative for rash.                                 Objective    Vitals:    01/31/25 1329   BP: 124/86   Pulse: 91   Resp: 20   Temp: 36.6 °C (97.9 °F)   TempSrc: Oral   SpO2: 98%   Weight: 83.9 kg (185 lb)   Height: 1.651 m (5' 5\")     No LMP recorded.  PHYSICAL EXAMINATION:    Physical Exam  Vitals and nursing note reviewed.   Constitutional:       General: She is not in acute distress.     Appearance: Normal appearance. She is not ill-appearing.   HENT:      Head: Normocephalic and atraumatic.      Right Ear: Tympanic membrane, ear canal and external ear normal.      Left Ear: Tympanic membrane, ear canal and external ear normal.      Nose: Nose normal.      Mouth/Throat:      Mouth: Mucous membranes are moist.      Pharynx: Oropharynx is clear. No oropharyngeal exudate or posterior oropharyngeal erythema.   Eyes:      General:         Right eye: No discharge.         Left eye: No discharge.      Extraocular Movements: Extraocular movements intact.      Conjunctiva/sclera: Conjunctivae normal.   Cardiovascular:      Rate and Rhythm: Normal rate and regular rhythm.      Heart sounds: Normal heart sounds.   Pulmonary:      Effort: Pulmonary effort is normal. No respiratory distress.      Breath sounds: No stridor. Wheezing (Slight " expiratory wheeze auscultated at lung bases) present. No rhonchi or rales.   Musculoskeletal:      Cervical back: Normal range of motion and neck supple.   Skin:     General: Skin is warm and dry.   Neurological:      General: No focal deficit present.      Mental Status: She is alert and oriented to person, place, and time.   Psychiatric:         Mood and Affect: Mood normal.         Behavior: Behavior normal.        Procedures    Results for orders placed or performed in visit on 01/31/25   POCT Influenza A/B manually resulted   Result Value Ref Range    POC Rapid Influenza A Negative Negative    POC Rapid Influenza B Negative Negative     === 01/31/25 ===    XR CHEST 2 VIEWS    - Impression -  1.  No evidence of acute cardiopulmonary process.        MACRO:  None    Signed by: Joleen Leyva 1/31/2025 2:33 PM  Dictation workstation:   [id number]     Diagnostic study results (if any) were reviewed by Sydni Park PA-C.    Assessment/Plan   Allergies, medications, history, and pertinent labs/EKGs/Imaging reviewed by Sydni Park PA-C.     Orders and Diagnoses  Diagnoses and all orders for this visit:  Respiratory tract infection  -     albuterol 90 mcg/actuation inhaler; Inhale 2 puffs every 6 hours if needed for wheezing or shortness of breath.  -     benzonatate (Tessalon) 200 mg capsule; Take 1 capsule (200 mg) by mouth every 8 hours if needed for cough (Dry cough). Do not crush or chew.  -     azithromycin (Zithromax) 250 mg tablet; Take 2 tabs (500 mg) by mouth today, then take 1 tab daily for 4 days.  Fever, unspecified fever cause  -     POCT Influenza A/B manually resulted  -     XR chest 2 views; Future  Chills  -     POCT Influenza A/B manually resulted  Chest congestion  -     POCT Influenza A/B manually resulted  -     XR chest 2 views; Future      Medical Admin Record    Given overall well appearance, vital signs, history, and exam as above, there is no indication for further emergent  testing/intervention at this time.      I discussed with the patient my clinical thoughts at this time given the above and we had a shared decision-making conversation in a patient-centered decision-making model on how to proceed forward. Pt was instructed on the importance of close follow-up. They were told that an urgent care diagnosis is often a preliminary impression and that definitive care is often not able to be given in the urgent care setting. Pt was educated that close follow-up is essential for good health and good outcomes. Patient was provided with the following instructions:         May trial abx as directed.       May take benzonatate for symptomatic relief of dry cough as needed. Add guaifenesin for mucus clearance if needed.    Albuterol inhaler for symptomatic relief as directed/as needed.     Cool mist humidifier in room at night while sleeping. Sleep in semi elevated position.    Tea with honey, throat lozenges, vapor rub, voice rest.     Tylenol or ibuprofen for fevers/pain if needed.      Plenty of fluids and rest.      Good hand hygiene.      Follow up with PCP or RTC in the next 7-10 days if sx fail to show adequate improvement.        Clinical impression as well as limitations of available testing/examination, all discussed with patient. Pt is well at this time in the urgent care. They are comfortable with the present assessment and plan to be discharged home. Discussed with them close outpatient follow up, reassessment, and possible further testing/treatment via their PCP/specialist if symptoms fail to improve; they agree, understand, and are comfortable with this plan. Pt given the opportunity to ask questions prior to discharge and all questions were answered at this time. Via our discussion, the patient was advised of warning signs and instructions were reviewed. Strict ED precautions were given, acknowledged, and understood. Discussed with the patient/family that it is okay to return to  the urgent care at any time for anything. Advised to present to the ED if present condition changes/worsens or if they develop new symptoms at any time after discharge. Also, advised to go to the ED if they cannot establish outpatient follow-up in time frame specified above. Pt verbalized understanding and agreement with plan and instructions. Discussed the need for close follow up with their primary care provider and/or specialist for further testing/treatment/care/consultation in the short time frame as noted above, if needed - they understand these instructions and agree to close follow up for these reasons. Patient discharged home in stable condition.      Follow Up Instructions  No follow-ups on file.    Patient disposition: Home    Electronically signed by Sydni Park PA-C  2:38 PM

## 2025-02-11 ENCOUNTER — APPOINTMENT (OUTPATIENT)
Dept: PRIMARY CARE | Facility: CLINIC | Age: 35
End: 2025-02-11
Payer: COMMERCIAL

## 2025-02-11 VITALS
DIASTOLIC BLOOD PRESSURE: 62 MMHG | BODY MASS INDEX: 30.99 KG/M2 | SYSTOLIC BLOOD PRESSURE: 114 MMHG | OXYGEN SATURATION: 97 % | WEIGHT: 186.2 LBS | HEART RATE: 92 BPM

## 2025-02-11 DIAGNOSIS — F41.9 ANXIETY: ICD-10-CM

## 2025-02-11 DIAGNOSIS — F90.2 ATTENTION DEFICIT HYPERACTIVITY DISORDER (ADHD), COMBINED TYPE: ICD-10-CM

## 2025-02-11 DIAGNOSIS — Z30.41 ENCOUNTER FOR SURVEILLANCE OF CONTRACEPTIVE PILLS: ICD-10-CM

## 2025-02-11 PROCEDURE — 1036F TOBACCO NON-USER: CPT | Performed by: FAMILY MEDICINE

## 2025-02-11 PROCEDURE — 90656 IIV3 VACC NO PRSV 0.5 ML IM: CPT | Performed by: FAMILY MEDICINE

## 2025-02-11 PROCEDURE — 90471 IMMUNIZATION ADMIN: CPT | Performed by: FAMILY MEDICINE

## 2025-02-11 PROCEDURE — G2211 COMPLEX E/M VISIT ADD ON: HCPCS | Performed by: FAMILY MEDICINE

## 2025-02-11 PROCEDURE — 99213 OFFICE O/P EST LOW 20 MIN: CPT | Performed by: FAMILY MEDICINE

## 2025-02-11 RX ORDER — METHYLPHENIDATE HYDROCHLORIDE 27 MG/1
27 TABLET ORAL DAILY
Qty: 90 TABLET | Refills: 0 | Status: SHIPPED | OUTPATIENT
Start: 2025-02-11

## 2025-02-11 RX ORDER — BUPROPION HYDROCHLORIDE 300 MG/1
300 TABLET ORAL DAILY
Qty: 90 TABLET | Refills: 1 | Status: SHIPPED | OUTPATIENT
Start: 2025-02-11

## 2025-02-11 RX ORDER — NORETHINDRONE ACETATE AND ETHINYL ESTRADIOL 1MG-20(21)
KIT ORAL
Qty: 112 TABLET | Refills: 3 | Status: SHIPPED | OUTPATIENT
Start: 2025-02-11

## 2025-02-18 NOTE — ASSESSMENT & PLAN NOTE
Stable  Orders:    buPROPion XL (Wellbutrin XL) 300 mg 24 hr tablet; Take 1 tablet (300 mg) by mouth once daily.

## 2025-02-18 NOTE — ASSESSMENT & PLAN NOTE
Patient is able to get a 90-day supply.  Due for urine drug screen and controlled substance agreement again next appointment  Orders:    methylphenidate ER 27 mg oral extended release tablet; Take 1 tablet (27 mg) by mouth once daily.

## 2025-02-18 NOTE — PROGRESS NOTES
Subjective   Patient ID: Regine Palomino is a 34 y.o. female who presents for Med Refill (Med refills -Concerta and birth control.).  Doing well.  Acute complaints -none    Last visit was 3 months ago    OARRS:  No data recorded  I have personally reviewed the OARRS report for Regine Palomino. I have considered the risks of abuse, dependence, addiction and diversion and I believe that it is clinically appropriate for Regine Palomino to be prescribed this medication    Is the patient prescribed a combination of a benzodiazepine and opioid?  No    Last Urine Drug Screen / ordered today: No  Recent Results (from the past 8760 hours)   Drug Screen, Urine With Reflex to Confirmation    Collection Time: 04/25/24  2:14 PM   Result Value Ref Range    Amphetamine Screen, Urine Presumptive Negative Presumptive Negative    Barbiturate Screen, Urine Presumptive Negative Presumptive Negative    Benzodiazepines Screen, Urine Presumptive Negative Presumptive Negative    Cannabinoid Screen, Urine Presumptive Negative Presumptive Negative    Cocaine Metabolite Screen, Urine Presumptive Negative Presumptive Negative    Fentanyl Screen, Urine Presumptive Negative Presumptive Negative    Opiate Screen, Urine Presumptive Negative Presumptive Negative    Oxycodone Screen, Urine Presumptive Negative Presumptive Negative    PCP Screen, Urine Presumptive Negative Presumptive Negative    Methadone Screen, Urine Presumptive Negative Presumptive Negative     Results are as expected.     Controlled Substance Agreement:  Date of the Last Agreement: April 2024  Reviewed Controlled Substance Agreement including but not limited to the benefits, risks, and alternatives to treatment with a Controlled Substance medication(s).    Stimulants:   What is the patient's goal of therapy?  Better focus  Is this being achieved with current treatment?  Yes    Activities of Daily Living:   Is your overall impression that this patient is benefiting  (symptom reduction outweighs side effects) from stimulant therapy? Yes     PE:  /62   Pulse 92   Wt 84.5 kg (186 lb 3.2 oz)   SpO2 97%   BMI 30.99 kg/m²   Alert, NAD  Heart RRR no murmur  Lungs CTA bilat  Affect pleasant    Assessment/Plan   Assessment & Plan  Attention deficit hyperactivity disorder (ADHD), combined type  Patient is able to get a 90-day supply.  Due for urine drug screen and controlled substance agreement again next appointment  Orders:    methylphenidate ER 27 mg oral extended release tablet; Take 1 tablet (27 mg) by mouth once daily.    Encounter for surveillance of contraceptive pills    Orders:    norethindrone-e.estradioL-iron (Junel FE 1/20) 1 mg-20 mcg (21)/75 mg (7) tablet; Take 1 po continuously without menses, no placebo pills    Anxiety  Stable  Orders:    buPROPion XL (Wellbutrin XL) 300 mg 24 hr tablet; Take 1 tablet (300 mg) by mouth once daily.

## 2025-05-13 ENCOUNTER — APPOINTMENT (OUTPATIENT)
Dept: PRIMARY CARE | Facility: CLINIC | Age: 35
End: 2025-05-13
Payer: COMMERCIAL

## 2025-05-13 VITALS
SYSTOLIC BLOOD PRESSURE: 124 MMHG | WEIGHT: 191.2 LBS | OXYGEN SATURATION: 98 % | HEART RATE: 70 BPM | DIASTOLIC BLOOD PRESSURE: 80 MMHG | BODY MASS INDEX: 31.82 KG/M2

## 2025-05-13 DIAGNOSIS — F90.2 ATTENTION DEFICIT HYPERACTIVITY DISORDER (ADHD), COMBINED TYPE: ICD-10-CM

## 2025-05-13 PROCEDURE — 1036F TOBACCO NON-USER: CPT | Performed by: FAMILY MEDICINE

## 2025-05-13 PROCEDURE — 99213 OFFICE O/P EST LOW 20 MIN: CPT | Performed by: FAMILY MEDICINE

## 2025-05-13 RX ORDER — METHYLPHENIDATE HYDROCHLORIDE 27 MG/1
27 TABLET ORAL DAILY
Qty: 90 TABLET | Refills: 0 | Status: SHIPPED | OUTPATIENT
Start: 2025-05-13

## 2025-05-13 ASSESSMENT — PATIENT HEALTH QUESTIONNAIRE - PHQ9
2. FEELING DOWN, DEPRESSED OR HOPELESS: NOT AT ALL
1. LITTLE INTEREST OR PLEASURE IN DOING THINGS: NOT AT ALL
SUM OF ALL RESPONSES TO PHQ9 QUESTIONS 1 AND 2: 0

## 2025-05-14 LAB
AMPHETAMINES UR QL: NEGATIVE NG/ML
BARBITURATES UR QL: NEGATIVE NG/ML
BENZODIAZ UR QL: NEGATIVE NG/ML
BZE UR QL: NEGATIVE NG/ML
CREAT UR-MCNC: 62.1 MG/DL
FENTANYL UR QL SCN: NEGATIVE NG/ML
METHADONE UR QL: NEGATIVE NG/ML
OPIATES UR QL: NEGATIVE NG/ML
OXIDANTS UR QL: NEGATIVE MCG/ML
OXYCODONE UR QL: NEGATIVE NG/ML
PCP UR QL: NEGATIVE NG/ML
PH UR: 6 [PH] (ref 4.5–9)
QUEST NOTES AND COMMENTS: NORMAL
THC UR QL: NEGATIVE NG/ML

## 2025-05-25 NOTE — ASSESSMENT & PLAN NOTE
Orders:    methylphenidate ER 27 mg oral extended release tablet; Take 1 tablet (27 mg) by mouth once daily.    Drug Screen, Urine With Reflex to Confirmation

## 2025-05-25 NOTE — PROGRESS NOTES
Subjective   Patient ID: Regine Palomino is a 34 y.o. female who presents for ADHD (3 month follow up and med refill. ).  Doing well.  Acute complaints -none    Last visit was 3 months ago    OARRS:  No data recorded  I have personally reviewed the OARRS report for Regine Palomino. I have considered the risks of abuse, dependence, addiction and diversion and I believe that it is clinically appropriate for Regine Palomino to be prescribed this medication    Is the patient prescribed a combination of a benzodiazepine and opioid?  No    Last Urine Drug Screen / ordered today: Yes  Recent Results (from the past 8760 hours)   Drug Screen, Urine With Reflex to Confirmation    Collection Time: 05/13/25  3:46 PM   Result Value Ref Range    Fentanyl NEGATIVE <0.5 ng/mL    Amphetamines NEGATIVE <500 ng/mL    Barbiturates NEGATIVE <300 ng/mL    Benzodiazepines NEGATIVE <100 ng/mL    Cocaine Metabolite NEGATIVE <150 ng/mL    Marijuana Metabolite NEGATIVE <20 ng/mL    Methadone Metabolite NEGATIVE <100 ng/mL    Opiates NEGATIVE <100 ng/mL    Oxycodone NEGATIVE <100 ng/mL    Phencyclidine NEGATIVE <25 ng/mL    Creatinine 62.1 > or = 20.0 mg/dL    pH 6.0 4.5 - 9.0    Oxidant NEGATIVE <200 mcg/mL    Notes and Comments       Results are as expected.     Controlled Substance Agreement:  Date of the Last Agreement: today  Reviewed Controlled Substance Agreement including but not limited to the benefits, risks, and alternatives to treatment with a Controlled Substance medication(s).    Stimulants:   What is the patient's goal of therapy? Better focus  Is this being achieved with current treatment? yes    Activities of Daily Living:   Is your overall impression that this patient is benefiting (symptom reduction outweighs side effects) from stimulant therapy? Yes         PE:  /80   Pulse 70   Wt 86.7 kg (191 lb 3.2 oz)   SpO2 98%   BMI 31.82 kg/m²   Alert, NAD  Heart RRR no murmur  Lungs CTA bilat  Affect  pleasant    Assessment/Plan   Assessment & Plan  Attention deficit hyperactivity disorder (ADHD), combined type    Orders:    methylphenidate ER 27 mg oral extended release tablet; Take 1 tablet (27 mg) by mouth once daily.    Drug Screen, Urine With Reflex to Confirmation      Follow up 3 months or sooner if needed

## 2025-05-28 ENCOUNTER — PATIENT MESSAGE (OUTPATIENT)
Dept: PRIMARY CARE | Facility: CLINIC | Age: 35
End: 2025-05-28
Payer: COMMERCIAL

## 2025-08-07 ENCOUNTER — APPOINTMENT (OUTPATIENT)
Dept: PRIMARY CARE | Facility: CLINIC | Age: 35
End: 2025-08-07
Payer: COMMERCIAL

## 2025-08-07 VITALS
BODY MASS INDEX: 31.62 KG/M2 | DIASTOLIC BLOOD PRESSURE: 74 MMHG | OXYGEN SATURATION: 98 % | WEIGHT: 190 LBS | HEART RATE: 84 BPM | SYSTOLIC BLOOD PRESSURE: 118 MMHG

## 2025-08-07 DIAGNOSIS — F90.2 ATTENTION DEFICIT HYPERACTIVITY DISORDER (ADHD), COMBINED TYPE: Primary | ICD-10-CM

## 2025-08-07 DIAGNOSIS — F41.9 ANXIETY: ICD-10-CM

## 2025-08-07 PROCEDURE — 99213 OFFICE O/P EST LOW 20 MIN: CPT | Performed by: FAMILY MEDICINE

## 2025-08-07 RX ORDER — ONDANSETRON 4 MG/1
4 TABLET, FILM COATED ORAL EVERY 8 HOURS PRN
COMMUNITY
Start: 2025-05-18

## 2025-08-07 ASSESSMENT — PATIENT HEALTH QUESTIONNAIRE - PHQ9
SUM OF ALL RESPONSES TO PHQ9 QUESTIONS 1 AND 2: 0
2. FEELING DOWN, DEPRESSED OR HOPELESS: NOT AT ALL
1. LITTLE INTEREST OR PLEASURE IN DOING THINGS: NOT AT ALL

## 2025-08-17 RX ORDER — METHYLPHENIDATE HYDROCHLORIDE 27 MG/1
27 TABLET ORAL DAILY
Qty: 90 TABLET | Refills: 0 | Status: SHIPPED | OUTPATIENT
Start: 2025-08-17

## 2025-08-17 RX ORDER — BUPROPION HYDROCHLORIDE 300 MG/1
300 TABLET ORAL DAILY
Qty: 90 TABLET | Refills: 1 | Status: SHIPPED | OUTPATIENT
Start: 2025-08-17

## 2025-11-06 ENCOUNTER — APPOINTMENT (OUTPATIENT)
Dept: PRIMARY CARE | Facility: CLINIC | Age: 35
End: 2025-11-06
Payer: COMMERCIAL

## (undated) DEVICE — NEEDLE, CLOSURE, OMNICLOSE

## (undated) DEVICE — TUBE SET, PNEUMOLAR HEATED, SMOKE EVACU, HIGH-FLOW

## (undated) DEVICE — RETRIEVAL SYSTEM, MONARCH, 10MM DISP ENDOSCOPIC

## (undated) DEVICE — EDGE ROCKERSWITCH PENCIL W/HOLSTER AND 3M CORD

## (undated) DEVICE — CLIP, ENDO APPLIER LIGAMAX 5MM

## (undated) DEVICE — CONNECTOR, 5 IN 1, STERILE

## (undated) DEVICE — TROCAR, KII OPTICAL BLADELESS 5MM Z THREAD 100MM LNGTH

## (undated) DEVICE — SLEEVE, KII, Z-THREAD, 5X100CM

## (undated) DEVICE — Device

## (undated) DEVICE — APPLICATOR, CHLORAPREP, W/ORANGE TINT, 26ML

## (undated) DEVICE — SLEEVE, VASO PRESS, CALF GARMENT, MEDIUM, GREEN

## (undated) DEVICE — FILTER, LAPAROSCOPIC, PLUME PORT, W/LUER CONNECTOR, STERILE

## (undated) DEVICE — TROCAR, OPTICAL, BLADELESS, 12MM, THREADED, 100MM LENGTH